# Patient Record
Sex: FEMALE | Race: WHITE | URBAN - NONMETROPOLITAN AREA
[De-identification: names, ages, dates, MRNs, and addresses within clinical notes are randomized per-mention and may not be internally consistent; named-entity substitution may affect disease eponyms.]

---

## 2020-04-22 ENCOUNTER — OFFICE VISIT (OUTPATIENT)
Dept: FAMILY MEDICINE CLINIC | Age: 30
End: 2020-04-22
Payer: COMMERCIAL

## 2020-04-22 VITALS
WEIGHT: 152 LBS | DIASTOLIC BLOOD PRESSURE: 70 MMHG | SYSTOLIC BLOOD PRESSURE: 136 MMHG | OXYGEN SATURATION: 99 % | BODY MASS INDEX: 25.95 KG/M2 | HEART RATE: 76 BPM | HEIGHT: 64 IN

## 2020-04-22 PROCEDURE — 99204 OFFICE O/P NEW MOD 45 MIN: CPT

## 2020-04-22 PROCEDURE — 1036F TOBACCO NON-USER: CPT | Performed by: FAMILY MEDICINE

## 2020-04-22 PROCEDURE — 99204 OFFICE O/P NEW MOD 45 MIN: CPT | Performed by: FAMILY MEDICINE

## 2020-04-22 PROCEDURE — G8419 CALC BMI OUT NRM PARAM NOF/U: HCPCS | Performed by: FAMILY MEDICINE

## 2020-04-22 PROCEDURE — G8427 DOCREV CUR MEDS BY ELIG CLIN: HCPCS | Performed by: FAMILY MEDICINE

## 2020-04-22 ASSESSMENT — PATIENT HEALTH QUESTIONNAIRE - PHQ9
1. LITTLE INTEREST OR PLEASURE IN DOING THINGS: 1
SUM OF ALL RESPONSES TO PHQ9 QUESTIONS 1 & 2: 2
SUM OF ALL RESPONSES TO PHQ QUESTIONS 1-9: 2
2. FEELING DOWN, DEPRESSED OR HOPELESS: 1
SUM OF ALL RESPONSES TO PHQ QUESTIONS 1-9: 2

## 2020-04-22 NOTE — PROGRESS NOTES
Taylor Kim or similar      Discussed medications use and time of action today. Side effects and precautions also reviewed today. Should follow up as scheduled but call if sx are worsening in the interim. Return in about 4 weeks (around 5/20/2020) for Pap/ well woman, Medication recheck. Patient given educational materials - see patientinstructions. Discussed use, benefit, and side effects of prescribed medications. All patient questions answered. Pt voiced understanding. Reviewed health maintenance. Instructed to continue current medications, diet andexercise. Patient agreed with treatment plan. Follow up as directed.      Electronically signed by Anatoliy Raymond MD on 4/26/2020

## 2020-06-10 ENCOUNTER — TELEPHONE (OUTPATIENT)
Dept: FAMILY MEDICINE CLINIC | Age: 30
End: 2020-06-10

## 2020-06-29 ENCOUNTER — OFFICE VISIT (OUTPATIENT)
Dept: FAMILY MEDICINE CLINIC | Age: 30
End: 2020-06-29
Payer: COMMERCIAL

## 2020-06-29 ENCOUNTER — HOSPITAL ENCOUNTER (OUTPATIENT)
Age: 30
Setting detail: SPECIMEN
Discharge: HOME | End: 2020-06-29
Payer: COMMERCIAL

## 2020-06-29 VITALS
SYSTOLIC BLOOD PRESSURE: 116 MMHG | HEART RATE: 64 BPM | DIASTOLIC BLOOD PRESSURE: 66 MMHG | HEIGHT: 62 IN | BODY MASS INDEX: 26.5 KG/M2 | WEIGHT: 144 LBS | OXYGEN SATURATION: 98 %

## 2020-06-29 LAB — HGB, POC: 12.6

## 2020-06-29 PROCEDURE — 90715 TDAP VACCINE 7 YRS/> IM: CPT | Performed by: FAMILY MEDICINE

## 2020-06-29 PROCEDURE — 99395 PREV VISIT EST AGE 18-39: CPT

## 2020-06-29 PROCEDURE — G8427 DOCREV CUR MEDS BY ELIG CLIN: HCPCS | Performed by: FAMILY MEDICINE

## 2020-06-29 PROCEDURE — 85018 HEMOGLOBIN: CPT | Performed by: FAMILY MEDICINE

## 2020-06-29 PROCEDURE — 99214 OFFICE O/P EST MOD 30 MIN: CPT | Performed by: FAMILY MEDICINE

## 2020-06-29 PROCEDURE — G8419 CALC BMI OUT NRM PARAM NOF/U: HCPCS | Performed by: FAMILY MEDICINE

## 2020-06-29 PROCEDURE — 87624 HPV HI-RISK TYP POOLED RSLT: CPT

## 2020-06-29 PROCEDURE — 1036F TOBACCO NON-USER: CPT | Performed by: FAMILY MEDICINE

## 2020-06-29 PROCEDURE — 87591 N.GONORRHOEAE DNA AMP PROB: CPT

## 2020-06-29 PROCEDURE — G0145 SCR C/V CYTO,THINLAYER,RESCR: HCPCS

## 2020-06-29 PROCEDURE — 87491 CHLMYD TRACH DNA AMP PROBE: CPT

## 2020-06-29 RX ORDER — NORGESTIMATE AND ETHINYL ESTRADIOL 7DAYSX3 LO
1 KIT ORAL DAILY
Qty: 28 TABLET | Refills: 3 | Status: SHIPPED | OUTPATIENT
Start: 2020-06-29 | End: 2020-10-13

## 2020-06-29 NOTE — PROGRESS NOTES
BP: 116/66   Site: Right Upper Arm   Position: Sitting   Cuff Size: Medium Adult   Pulse: 64   SpO2: 98%   Weight: 144 lb (65.3 kg)   Height: 5' 2\" (1.575 m)     Body mass index is 26.34 kg/m². Well-nourished, well-developed thinyoung  female, in no acute distress. Pupils are equal, round and reactive to light. Extra-ocular muscles are intact. Tympanic membranes and oropharynxare clear bilaterally. Neck is supple. There is no lymphadenopathy or thyromegaly. Breasts are symmetric. There are no dominant masses palpated bilaterally. There are no skin changes or nipple dischargebilaterally. There are no axillary lymph nodes palpable bilaterally. Chest is clear to auscultation bilaterally. Heart sounds are regular rate and rhythm without murmur. Abdomen is soft, non-tender,non-distended, with no masses. Genito-urinary:  External genitalia has no visible lesions,  vagina is normal, cervix has no visible lesions. Uterus and adnexa are non-tender and notenlarged. Rectal exam:  defered  Lower extremities have no edema. Labs done were reviewed with the patient:  No visits with results within 1 Month(s) from this visit. Latest known visit with results is:   No results found for any previous visit. Assessment and Plan:    Well female exam with routine gynecological exam  Pap smearwas obtained today using the Thin Prep method. She willbe contacted with results. not indicated   She was advised to continue annual mammograms and physical exams, with a Paptest every 3 years. Blood tests for sexually transmitted infections (HIV, ) were offered and declined. 1. Pap test, as part of routine gynecological examination    - PAP SMEAR; Future    2. Anxiety  At this point she has not had of social situations with the Covid 19 to really be able to tell if this is been effective treatment.   We will go ahead and continue on the current dose of the sertraline but would consider change to another

## 2020-07-01 ENCOUNTER — TELEPHONE (OUTPATIENT)
Dept: FAMILY MEDICINE CLINIC | Age: 30
End: 2020-07-01

## 2020-07-01 LAB
CHLAMYDIA BY THIN PREP: ABNORMAL
N. GONORRHOEAE DNA, THIN PREP: NEGATIVE

## 2020-07-01 RX ORDER — AZITHROMYCIN 500 MG/1
2000 TABLET, FILM COATED ORAL ONCE
Qty: 2 TABLET | Refills: 0 | Status: SHIPPED | OUTPATIENT
Start: 2020-07-01 | End: 2020-07-01

## 2020-07-01 NOTE — TELEPHONE ENCOUNTER
Patient informed by phone, instructed to have partner/partners treated and to abstain until after [facility] and [facility] notified.

## 2020-07-02 LAB
HPV SAMPLE: ABNORMAL
HPV, GENOTYPE 16: DETECTED
HPV, GENOTYPE 18: NOT DETECTED
HPV, HIGH RISK OTHER: NOT DETECTED
HPV, INTERPRETATION: ABNORMAL
SPECIMEN DESCRIPTION: ABNORMAL

## 2020-07-03 LAB — CYTOLOGY REPORT: NORMAL

## 2020-07-04 ENCOUNTER — TELEPHONE (OUTPATIENT)
Dept: FAMILY MEDICINE CLINIC | Age: 30
End: 2020-07-04

## 2020-07-04 NOTE — TELEPHONE ENCOUNTER
Final pap report came back with LGSIL- cannot exclude HGSIL. Patient will need a colposcopy. Will refer for this to GYN please. Left message for the patient to call about her results when the prelim came back with the option to repeat 1 this on the final should have a colposcopy.

## 2020-09-01 ENCOUNTER — HOSPITAL ENCOUNTER (OUTPATIENT)
Age: 30
Setting detail: SPECIMEN
Discharge: HOME | End: 2020-09-01
Payer: COMMERCIAL

## 2020-09-01 ENCOUNTER — PROCEDURE VISIT (OUTPATIENT)
Dept: OBGYN CLINIC | Age: 30
End: 2020-09-01
Payer: COMMERCIAL

## 2020-09-01 ENCOUNTER — TELEPHONE (OUTPATIENT)
Dept: OBGYN CLINIC | Age: 30
End: 2020-09-01

## 2020-09-01 VITALS
DIASTOLIC BLOOD PRESSURE: 73 MMHG | SYSTOLIC BLOOD PRESSURE: 130 MMHG | HEIGHT: 62 IN | WEIGHT: 135.4 LBS | BODY MASS INDEX: 24.92 KG/M2 | HEART RATE: 79 BPM

## 2020-09-01 LAB
CONTROL: PRESENT
PREGNANCY TEST URINE, POC: NEGATIVE

## 2020-09-01 PROCEDURE — 57500 BIOPSY OF CERVIX: CPT | Performed by: OBSTETRICS & GYNECOLOGY

## 2020-09-01 PROCEDURE — 57454 BX/CURETT OF CERVIX W/SCOPE: CPT | Performed by: OBSTETRICS & GYNECOLOGY

## 2020-09-01 PROCEDURE — 99203 OFFICE O/P NEW LOW 30 MIN: CPT | Performed by: OBSTETRICS & GYNECOLOGY

## 2020-09-01 PROCEDURE — 81025 URINE PREGNANCY TEST: CPT | Performed by: OBSTETRICS & GYNECOLOGY

## 2020-09-01 RX ORDER — AZITHROMYCIN 500 MG/1
1000 TABLET, FILM COATED ORAL ONCE
Qty: 2 TABLET | Refills: 0 | Status: SHIPPED | OUTPATIENT
Start: 2020-09-01 | End: 2020-09-01

## 2020-09-01 RX ORDER — AZITHROMYCIN 500 MG/1
1000 TABLET, FILM COATED ORAL ONCE
Qty: 4 TABLET | Refills: 0 | Status: SHIPPED | OUTPATIENT
Start: 2020-09-01 | End: 2020-09-01 | Stop reason: SDUPTHER

## 2020-09-01 NOTE — PATIENT INSTRUCTIONS
Patient Education        Colposcopy: What to Expect at [address] may feel some soreness in your vagina for a day or two if you had a biopsy. Some vaginal bleeding or discharge is normal for up to a week after a biopsy. The discharge may be dark-colored if a solution was put on your cervix. You can use a sanitary pad for the bleeding. It may take a week or two for you to get the test results. This care sheet gives you a general idea about how long it will take for you to recover. But each person recovers at a different pace. Follow the steps below to feel better as quickly as possible. How can you care for yourself at home? Activity  · You can return to work and most daily activities right after the test.  Exercise  · Do not exercise for 1 day after the test.  Medicines  · Your doctor will tell you if and when you can restart your medicines. He or she will also give you instructions about taking any new medicines. · If you take aspirin or some other blood thinner, ask your doctor if and when to start taking it again. Make sure that you understand exactly what your doctor wants you to do. · Take an over-the-counter pain medicine, such as acetaminophen (Tylenol), ibuprofen (Advil, Motrin), or naproxen (Aleve). Be safe with medicines. Read and follow all instructions on the label. Do not take two or more pain medicines at the same time unless the doctor told you to. Many pain medicines have acetaminophen, which is Tylenol. Too much acetaminophen (Tylenol) can be harmful. Other instructions  · Use a pad if you have some bleeding. · Do not douche, have sexual intercourse, or use tampons for 1 week if you had a biopsy. This will allow time for your cervix to heal.  · You can take a bath or shower anytime after the test.  Follow-up care is a key part of your treatment and safety. Be sure to make and go to all appointments, and call your doctor if you are having problems.  It's also a good idea to know your test results and keep a list of the medicines you take. When should you call for help? Call your doctor now or seek immediate medical care if:  · You have severe vaginal bleeding. This means that you are soaking through your usual pads or tampons each hour for 2 or more hours. · You have pain that does not get better after you take pain medicine. · You have signs of infection, such as:  ? Increased pain. ? Bad-smelling vaginal discharge. ? A fever. Watch closely for any changes in your health, and be sure to contact your doctor if:  · You have questions or concerns. Where can you learn more? Go to https://Tie [email or web address].[employer]. org and sign in to your [employer] account. Enter M523 in the [employer] box to learn more about \"Colposcopy: What to Expect at Home. \"     If you do not have an account, please click on the \"Sign Up Now\" link. Current as of: August 22, 2019               Content Version: 12.5  © 2052-3490 Healthwise, Incorporated. Care instructions adapted under license by [facility] ([facility]). If you have questions about a medical condition or this instruction, always ask your healthcare professional. [address] any warranty or liability for your use of this information.

## 2020-09-01 NOTE — PROGRESS NOTES
Brett Cerda  9/1/2020                         Primary Care Physician: Melida Osman MD    CC:   Chief Complaint   Patient presents with    Procedure         HPI: Brett Cerda is a 35 y.o. female [place] Patient's last menstrual period was 08/22/2020. The patient was seen and examined. She is here to establish care with a GYN due to abnormal pap smear which was ASC-H and +HPV16 on 6/29/20. She was diagnosed and treated for chlamydia at that time. She is sexually active with a male partner. She does not use condoms consistently. Admits that her partner was not treated for chlamydia. Her periods are regular and last 4-7 days. She describes them as lighter and less painful while on OCPs. Her bowel habits are regular. She denies any bloating. She denies dysuria. She denies urinary leaking. She denies vaginal discharge.       Depression Screen: Symptoms of decreased mood absent  Symptoms of anhedonia absent  **If either question is answered in a  positive fashion then complete the PHQ9 Scoring Evaluation and make the appropriate referral**    REVIEW OF SYSTEMS:   Constitutional: negative fever, negative chills  HEENT: negative visual disturbances, negative headaches  Respiratory: negative dyspnea, negative cough  Cardiovascular: negative chest pain,  negative palpitations  Gastrointestinal: negative abdominal pain, negative RUQ pain, negative N/V, negative diarrhea, negative constipation  Genitourinary: negative dysuria, negative vaginal discharge  Dermatological: negative rash  Hematologic: negative bruising  Immunologic/Lymphatic: negative recent illness, negative recent sick contact  Musculoskeletal: negative back pain, negative myalgias, negative arthralgias  Neurological:  negative dizziness, negative weakness  Behavior/Psych: negative depression, negative anxiety      GYNECOLOGICAL HISTORY:  Age of Menarche: 16  Age of Menopause: n/a     Sexually Active: has sex with males  STD History: past history: chlamydia    Pap History: ASC-H +HPV16 20  Colposcopy History: denies    Permanent Sterilization: no  Reversible Birth Control: yes - OCPs  Hormone Replacement Exposure: no    OBSTETRICAL HISTORY:  OB History    Para Term  AB Living   0 0 0 0 0 0   SAB TAB Ectopic Molar Multiple Live Births   0 0 0 0 0 0       PAST MEDICAL HISTORY:   has a past medical history of Abnormal Pap smear of cervix. PAST SURGICAL HISTORY:   has a past surgical history that includes [place] tooth extraction. ALLERGIES:  has No Known Allergies. MEDICATIONS:  Prior to Admission medications    Medication Sig Start Date End Date Taking? Authorizing Provider   azithromycin (ZITHROMAX) 500 MG tablet Take 2 tablets by mouth once for 1 dose Additional dose given for expedited partner therapy. 20 Yes See-Yin So, DO   sertraline (ZOLOFT) 50 MG tablet Take 1 tablet by mouth daily 20  Yes Sylvie Cruz MD   Norgestim-Eth Estrad Triphasic 0.18/0.215/0.25 MG-25 MCG TABS Take 1 each by mouth daily 20  Yes Sylvie Cruz MD       FAMILY HISTORY:  Family History of Breast, Ovarian, Colon or Uterine Cancer: No   family history includes Dementia in her maternal grandmother; Heart Disease in her paternal grandfather and paternal grandmother. SOCIAL HISTORY:   reports that she has never smoked. She has never used smokeless tobacco. She reports current alcohol use. She reports that she does not use drugs.     HEALTH MAINTENANCE:  Immunization status: stated as up to date, some records available  Gardisil immunization: discussed     VITALS:  Vitals:    20 0840   BP: 130/73   Site: Left Upper Arm   Position: Sitting   Cuff Size: Medium Adult   Pulse: 79   Weight: 135 lb 6.4 oz (61.4 kg)   Height: 5' 2\" (1.575 m)                                                                                                                                                                         PHYSICAL EXAM: General Appearance: Appears healthy. Alert; in no acute distress. Pleasant. Skin: Skin color, texture, turgor normal. No rashes or lesions. HEENT: normocephalic and atraumatic   Respiratory: Normal expansion. Clear to auscultation. No rales, rhonchi, or wheezing. Cardiovascular: normal rate and normal S1 and S2  Abdomen: soft, non-tender, non-distended, no right upper quadrant tenderness and no CVA tenderness   Pelvic Exam:   External genitalia: normal hair distribution, no lesions or erythema  Urinary system: urethral meatus normal, no bladder tenderness  Vaginal: normal mucosa, no lesions, thick white discharge noted  Cervix: normal appearing cervix without discharge or lesions, no CMT  Adnexa: normal adnexa in size, nontender and no masses  Uterus: about 6-8wk size, anteverted, nontender, no masses  Musculoskeletal: no gross abnormalities  Extremities: non-tender BLE and non-edematous  Psych:  oriented to time, place and person, mood and affect are within normal limits     ASSESSMENT & PLAN:    Maryse Alfaro is a 35 y.o. female  Patient's last menstrual period was 2020.   - reviewed ASCCP guidelines and recommendations. See separate note for colposcopy findings   - Reviewed medical management options for dysmenorrhea, including side effect profiles and dosing regimens. All questions answered. Patient desires to continue with OCPs. - She is unsure if she received the Gardasil immunization   - reviewed findings of increased vaginal discharge today and concern as patient's partner had not been treated for chlamydia. Reviewed that it is an STD. Recommended treatment with azithromycin for her and her partner. Discussed that they will need to abstain until at least one week after treatment. Recommend repeat STD testing in 3 months to ensure that she is not reinfected.  She vocalized understanding      Patient Active Problem List    Diagnosis Date Noted    Dysmenorrhea     Abnormal Pap smear of cervix        Return in about 3 months (around 12/1/2020) for follow up JOSEE. No Patient Care Coordination Note on file. Counseling Completed:    Discussed need for repeat pap as per American Society for Colposcopy and Cervical Pathology guidelines. Birth control and barrier recommendations reviewed. Discussed STD counseling and prevention. Gardisil counseling completed for all patients 7-33 yo. Hereditary Breast, Ovarian, Colon and Uterine Cancer screening done. Tobacco & Secondary smoke risks reviewed; with recommendation for cessation and avoidance. Routine health maintenance per patients PCP. Diagnosis Orders   1. Encounter to establish care     2. Atypical squamous cells cannot exclude high grade squamous intraepithelial lesion on cytologic smear of cervix (ASC-H)  POCT urine pregnancy    Surgical Pathology    FL COLPOSC,CERVIX W/ADJ VAG,W/BX & CURRETAG    Surgical Pathology    Surgical Pathology   3. HPV in female  POCT urine pregnancy    Surgical Pathology    FL COLPOSC,CERVIX W/ADJ VAG,W/BX & CURRETAG   4. Chlamydia infection  azithromycin (ZITHROMAX) 500 MG tablet    DISCONTINUED: azithromycin (ZITHROMAX) 500 MG tablet   5.  Dysmenorrhea            See-DO [facility] [place] more  9/1/2020 9:13 AM

## 2020-09-01 NOTE — TELEPHONE ENCOUNTER
[facility] cannot bill the script like it's sent. They will need to different scripts sent.  They can't bill for 2 different people on the same script

## 2020-09-03 LAB — SURGICAL PATHOLOGY REPORT: NORMAL

## 2020-10-26 ENCOUNTER — VIRTUAL VISIT (OUTPATIENT)
Dept: FAMILY MEDICINE CLINIC | Age: 30
End: 2020-10-26
Payer: COMMERCIAL

## 2020-10-26 PROCEDURE — 99214 OFFICE O/P EST MOD 30 MIN: CPT | Performed by: FAMILY MEDICINE

## 2020-10-26 RX ORDER — ESCITALOPRAM OXALATE 10 MG/1
10 TABLET ORAL DAILY
Qty: 30 TABLET | Refills: 5 | Status: SHIPPED | OUTPATIENT
Start: 2020-10-26 | End: 2022-04-25

## 2020-10-26 ASSESSMENT — PATIENT HEALTH QUESTIONNAIRE - PHQ9
SUM OF ALL RESPONSES TO PHQ QUESTIONS 1-9: 1
1. LITTLE INTEREST OR PLEASURE IN DOING THINGS: 0
SUM OF ALL RESPONSES TO PHQ QUESTIONS 1-9: 1
SUM OF ALL RESPONSES TO PHQ9 QUESTIONS 1 & 2: 1
2. FEELING DOWN, DEPRESSED OR HOPELESS: 1
SUM OF ALL RESPONSES TO PHQ QUESTIONS 1-9: 1

## 2020-10-26 NOTE — PROGRESS NOTES
10/26/2020    TELEHEALTH EVALUATION -- Audio/Visual (During COVBG-60 public health emergency)    Chief Complaint   Patient presents with    Anxiety     follow up zoloft- she wants to discuss trying something different, she does not like the way it makes her feel. Lety Vicente (:  1990) has requested an audio/video evaluation for the following concern(s):    HPI    Is back to work in person now. Going pretty well. Doing things a lot differently. Feels like her sertraline makes her feels more quiet. Wonders if it is \"too strong\". Did not really see a lot of change. Feels almost numb. Before wanted to talk and be more talkative. Sleeing well most of the time. AT times will be overthinking abut things but most of the time sleeps well. When she first started taking it had some trouble but that passed. Not really feeling down at all/  Will occasionally feel sad. When she feels sad it is more intense than it used to be and it is all she can think about when it happens. No thoughts about hurting herself  Within the last few months had 1 episode when she was overwhelmed with a lot going one. On the OCP she has had some painful periods. Has had one painful period. It was really bad. The other periods prior to this had been really overall significantly better with the oral contraceptive use. The bleeding and the pain had been significantly improved. Wonders if this is just a unusual cycle for her now and they would be better if she goes on. Review of Systems    Prior to Visit Medications    Medication Sig Taking?  Authorizing Provider   escitalopram (LEXAPRO) 10 MG tablet Take 1 tablet by mouth daily Yes Maria Luz Nichols MD   TRI-LO-JOE 0.18/0.215/0.25 MG-25 MCG TABS TAKE 1 TABLET BY MOUTH ONE TIME A DAY  Yes Maria Luz Nichols MD       Social History     Tobacco Use    Smoking status: Never Smoker    Smokeless tobacco: Never Used   Substance Use Topics    Alcohol use: Yes     Comment: occasionally    Drug use: Never        No Known Allergies,   Past Medical History:   Diagnosis Date    Abnormal Pap smear of cervix     Dysmenorrhea    ,   Past Surgical History:   Procedure Laterality Date    WISDOM TOOTH EXTRACTION     ,   Social History     Tobacco Use    Smoking status: Never Smoker    Smokeless tobacco: Never Used   Substance Use Topics    Alcohol use: Yes     Comment: occasionally    Drug use: Never   ,   Family History   Problem Relation Age of Onset    Dementia Maternal Grandmother     Heart Disease Paternal Grandmother     Heart Disease Paternal Grandfather     Breast Cancer Neg Hx     Colon Cancer Neg Hx     Uterine Cancer Neg Hx     Ovarian Cancer Neg Hx    ,   Immunization History   Administered Date(s) Administered    DTP 1991, 01/13/1992, 04/09/1992    DTaP vaccine 01/24/1994    Hepatitis B Ped/Adol (Engerix-B, Recombivax HB) 07/28/2004, 09/01/2004    Hib vaccine 1991, 01/31/1992, 04/09/1992, 08/23/1993    Influenza A (X1I3-57) Vaccine PF IM 10/20/2009    MMR 08/23/1993, 07/28/2004    Polio OPV 1991, 01/13/1992, 01/24/1994    Td vaccine (adult) 07/28/2004    Tdap (Boostrix, Adacel) 06/29/2020   ,   Health Maintenance   Topic Date Due    Varicella vaccine (1 of 2 - 2-dose childhood series) 08/24/1992    Hepatitis B vaccine (3 of 3 - 3-dose primary series) 11/17/2004    HIV screen  08/24/2006    Flu vaccine (1) 09/01/2020    Cervical cancer screen  06/29/2021    DTaP/Tdap/Td vaccine (7 - Td) 06/29/2030    Hib vaccine  Completed    Hepatitis A vaccine  Aged Out    Meningococcal (ACWY) vaccine  Aged Out    Pneumococcal 0-64 years Vaccine  Aged Out       PHYSICAL EXAMINATION:  [ INSTRUCTIONS:  \"[x]\" Indicates a positive item  \"[]\" Indicates a negative item  -- DELETE ALL ITEMS NOT EXAMINED]  Vital Signs: (As obtained by patient/caregiver or practitioner observation)  No flowsheet data found. Constitutional: [x] Appears well-developed and well-nourished [x] No apparent distress      [] Abnormal-   Mental status  [x] Alert and awake  [x] Oriented to person/place/time [x]Able to follow commands      Eyes:  EOM    [x]  Normal  [] Abnormal-  Sclera  [x]  Normal  [] Abnormal -         Discharge [x]  None visible  [] Abnormal -    HENT:   [x] Normocephalic, atraumatic. [] Abnormal   [x] Mouth/Throat: Mucous membranes are moist.     External Ears [x] Normal  [] Abnormal-     Neck: [x] No visualized mass     Pulmonary/Chest: [x] Respiratory effort normal.  [x] No visualized signs of difficulty breathing or respiratory distress        [] Abnormal-      Musculoskeletal:   [] Normal gait with no signs of ataxia         [x] Normal range of motion of neck        [] Abnormal-       Neurological:        [x] No Facial Asymmetry (Cranial nerve 7 motor function) (limited exam to video visit)          [x] No gaze palsy        [] Abnormal-         Skin:        [x] No significant exanthematous lesions or discoloration noted on facial skin         [] Abnormal-            Psychiatric:       [x] Normal Affect [x] No Hallucinations        [] Abnormal-     Other pertinent observable physical exam findings-     Due to this being a TeleHealth encounter, evaluation of the following organ systems is limited: Vitals/Constitutional/EENT/Resp/CV/GI//MS/Neuro/Skin/Heme-Lymph-Imm. ASSESSMENT/PLAN:  1. Anxiety  We will do the change to the S-Citalopram for her anxiety. Reviewed emergency procedures if she has increasing depressive symptoms. Hopefully over the next several weeks she will have improvement in her anxiety or depressive symptoms without the blunting of her affect that she is currently experiencing.   If this is also problematic to increase consider a trial of something like buspirone to see if this is more effective for her.  - escitalopram (LEXAPRO) 10 MG tablet; Take 1 tablet by mouth daily  Dispense: 30 tablet; Refill: 5    2. Menorrhagia with irregular cycle  Continue on the current dosage of her OCP. This is a low estrogen containing pill could try a monophasic with a slightly higher dose or different progesterone to see if this is effective for her if not improving. She will let us know about both of these issues in about 4 weeks. Return in about 4 weeks (around 11/23/2020). An  electronic signature was used to authenticate this note. --Claudio Scott MD on 10/26/2020 at 10:38 AM        Pursuant to the emergency declaration under the [facility] waiver authority and the [employer] and [employer] Act, this Virtual  Visit was conducted, with patient's consent, to reduce the patient's risk of exposure to COVID-19 and provide continuity of care for an established patient. Patient location: Patient home  Provider location: Provider clinic  Services were provided through a video synchronous discussion virtually to substitute for in-person clinic visit.

## 2020-11-23 ENCOUNTER — VIRTUAL VISIT (OUTPATIENT)
Dept: FAMILY MEDICINE CLINIC | Age: 30
End: 2020-11-23
Payer: COMMERCIAL

## 2020-11-23 PROCEDURE — 99214 OFFICE O/P EST MOD 30 MIN: CPT | Performed by: FAMILY MEDICINE

## 2020-11-23 ASSESSMENT — PATIENT HEALTH QUESTIONNAIRE - PHQ9
SUM OF ALL RESPONSES TO PHQ QUESTIONS 1-9: 0
SUM OF ALL RESPONSES TO PHQ9 QUESTIONS 1 & 2: 0
2. FEELING DOWN, DEPRESSED OR HOPELESS: 0
SUM OF ALL RESPONSES TO PHQ QUESTIONS 1-9: 0
1. LITTLE INTEREST OR PLEASURE IN DOING THINGS: 0
SUM OF ALL RESPONSES TO PHQ QUESTIONS 1-9: 0

## 2020-11-23 NOTE — PROGRESS NOTES
2020    TELEHEALTH EVALUATION -- Audio/Visual (During RIKSA-72 public health emergency)    Chief Complaint   Patient presents with    Anxiety     follow up after starting lexapro- doing well on lexapro. She feels like it does not \"hit her as hard\" as the zoloft. Krissy Amaya (:  1990) has requested an audio/video evaluation for the following concern(s):    HPI  Was started on the sertraline for her social anxiety and had not Felt as good on this. Had more trouble with some insomnia and overthinking. She was almost numb at times on the Sertraline. HAs not had times where she was down or inappropriately sad at all. Does feel like she is doing better,  Feels calmer. Sleeping well over all. Energy is good. No anxiety attacks. No issue sat all with the medications. Feels like maybe 25% better-- difficult to say really because she has not been in any larger groups where she normally has had her greatest issues. Has not had a menstrual cycle to really tell is her periods are back to normal.     Review of Systems    Prior to Visit Medications    Medication Sig Taking?  Authorizing Provider   escitalopram (LEXAPRO) 10 MG tablet Take 1 tablet by mouth daily Yes Wendy Mock MD   TRI-LO-JOE 0.18/0.215/0.25 MG-25 MCG TABS TAKE 1 TABLET BY MOUTH ONE TIME A DAY  Yes Wendy Mock MD       Social History     Tobacco Use    Smoking status: Never Smoker    Smokeless tobacco: Never Used   Substance Use Topics    Alcohol use: Yes     Comment: occasionally    Drug use: Never        No Known Allergies,   Past Medical History:   Diagnosis Date    Abnormal Pap smear of cervix     Dysmenorrhea    ,   Past Surgical History:   Procedure Laterality Date    WISDOM TOOTH EXTRACTION     ,   Social History     Tobacco Use    Smoking status: Never Smoker    Smokeless tobacco: Never Used   Substance Use Topics    Alcohol use: Yes     Comment: occasionally    Drug use: Never   ,   Family History Problem Relation Age of Onset    Dementia Maternal Grandmother     Heart Disease Paternal Grandmother     Heart Disease Paternal Grandfather     Breast Cancer Neg Hx     Colon Cancer Neg Hx     Uterine Cancer Neg Hx     Ovarian Cancer Neg Hx    ,   Immunization History   Administered Date(s) Administered    DTP 1990, 01/13/1992, 04/09/1992    DTaP vaccine 01/24/1994    Hepatitis B Ped/Adol (Engerix-B, Recombivax HB) 07/28/2004, 09/01/2004    Hib vaccine 1990, 01/31/1992, 04/09/1992, 08/23/1993    Influenza A (L1E1-98) Vaccine PF IM 10/20/2009    MMR 08/23/1993, 07/28/2004    Polio OPV 1991, 01/13/1992, 01/24/1994    Td vaccine (adult) 07/28/2004    Tdap (Boostrix, Adacel) 06/29/2020   ,   Health Maintenance   Topic Date Due    Varicella vaccine (1 of 2 - 2-dose childhood series) 08/24/1992    Hepatitis B vaccine (3 of 3 - 3-dose primary series) 11/17/2004    HIV screen  08/24/2006    Flu vaccine (1) 09/01/2020    Cervical cancer screen  06/29/2021    DTaP/Tdap/Td vaccine (7 - Td) 06/29/2030    Hib vaccine  Completed    Hepatitis A vaccine  Aged Out    Meningococcal (ACWY) vaccine  Aged Out    Pneumococcal 0-64 years Vaccine  Aged Out       PHYSICAL EXAMINATION:  [ INSTRUCTIONS:  \"[x]\" Indicates a positive item  \"[]\" Indicates a negative item  -- DELETE ALL ITEMS NOT EXAMINED]  Vital Signs: (As obtained by patient/caregiver or practitioner observation)  No flowsheet data found. Constitutional: [x] Appears well-developed and well-nourished [x] No apparent distress      [] Abnormal-   Mental status  [x] Alert and awake  [x] Oriented to person/place/time [x]Able to follow commands      Eyes:  EOM    [x]  Normal  [] Abnormal-  Sclera  [x]  Normal  [] Abnormal -         Discharge [x]  None visible  [] Abnormal -    HENT:   [x] Normocephalic, atraumatic.   [] Abnormal   [x] Mouth/Throat: Mucous membranes are moist.     External Ears [x] Normal  [] Abnormal-     Neck: [x] No

## 2022-04-07 RX ORDER — NORGESTIMATE AND ETHINYL ESTRADIOL 7DAYSX3 LO
KIT ORAL
Qty: 28 TABLET | Refills: 0 | OUTPATIENT
Start: 2022-04-07

## 2022-04-08 RX ORDER — NORGESTIMATE AND ETHINYL ESTRADIOL 7DAYSX3 LO
KIT ORAL
Qty: 28 TABLET | Refills: 0 | OUTPATIENT
Start: 2022-04-08

## 2022-04-11 RX ORDER — NORGESTIMATE AND ETHINYL ESTRADIOL 7DAYSX3 LO
KIT ORAL
Qty: 28 TABLET | Refills: 0 | OUTPATIENT
Start: 2022-04-11

## 2022-04-25 ENCOUNTER — OFFICE VISIT (OUTPATIENT)
Dept: FAMILY MEDICINE CLINIC | Age: 32
End: 2022-04-25
Payer: COMMERCIAL

## 2022-04-25 VITALS
HEIGHT: 62 IN | SYSTOLIC BLOOD PRESSURE: 104 MMHG | HEART RATE: 72 BPM | DIASTOLIC BLOOD PRESSURE: 60 MMHG | WEIGHT: 137 LBS | OXYGEN SATURATION: 99 % | BODY MASS INDEX: 25.21 KG/M2

## 2022-04-25 DIAGNOSIS — N94.6 DYSMENORRHEA: Primary | ICD-10-CM

## 2022-04-25 PROCEDURE — 99213 OFFICE O/P EST LOW 20 MIN: CPT | Performed by: FAMILY MEDICINE

## 2022-04-25 RX ORDER — NORGESTIMATE AND ETHINYL ESTRADIOL 7DAYSX3 LO
KIT ORAL
Qty: 84 TABLET | Refills: 3 | Status: SHIPPED | OUTPATIENT
Start: 2022-04-25

## 2022-04-25 SDOH — ECONOMIC STABILITY: TRANSPORTATION INSECURITY
IN THE PAST 12 MONTHS, HAS THE LACK OF TRANSPORTATION KEPT YOU FROM MEDICAL APPOINTMENTS OR FROM GETTING MEDICATIONS?: NO

## 2022-04-25 SDOH — ECONOMIC STABILITY: FOOD INSECURITY: WITHIN THE PAST 12 MONTHS, YOU WORRIED THAT YOUR FOOD WOULD RUN OUT BEFORE YOU GOT MONEY TO BUY MORE.: NEVER TRUE

## 2022-04-25 SDOH — ECONOMIC STABILITY: FOOD INSECURITY: WITHIN THE PAST 12 MONTHS, THE FOOD YOU BOUGHT JUST DIDN'T LAST AND YOU DIDN'T HAVE MONEY TO GET MORE.: NEVER TRUE

## 2022-04-25 SDOH — ECONOMIC STABILITY: TRANSPORTATION INSECURITY
IN THE PAST 12 MONTHS, HAS LACK OF TRANSPORTATION KEPT YOU FROM MEETINGS, WORK, OR FROM GETTING THINGS NEEDED FOR DAILY LIVING?: NO

## 2022-04-25 ASSESSMENT — PATIENT HEALTH QUESTIONNAIRE - PHQ9
1. LITTLE INTEREST OR PLEASURE IN DOING THINGS: 0
SUM OF ALL RESPONSES TO PHQ QUESTIONS 1-9: 0
SUM OF ALL RESPONSES TO PHQ9 QUESTIONS 1 & 2: 0
SUM OF ALL RESPONSES TO PHQ QUESTIONS 1-9: 0
2. FEELING DOWN, DEPRESSED OR HOPELESS: 0

## 2022-04-25 ASSESSMENT — SOCIAL DETERMINANTS OF HEALTH (SDOH): HOW HARD IS IT FOR YOU TO PAY FOR THE VERY BASICS LIKE FOOD, HOUSING, MEDICAL CARE, AND HEATING?: NOT HARD AT ALL

## 2022-04-25 NOTE — PROGRESS NOTES
[address] [facility]  [address]  Dept: [phone]  Dept NW:[phone]    Vikki Lipscomb is a 28 y.o. female who presents today for her medical conditions/complaints as notedbelow. Vikki Lipscomb is c/o of Follow-up (follow up - needs refills)      HPI:     HPI    Periods have been pretty regular. Her cramping is reasonable at this time. Usually bleeds about 4-5 days per cycle. Periods are medium flow. She continues on her birth control pill and tolerates it well. Had a colpo with BX in 9/2020. This was done through GYN. This was negative and she was told she should follow-up for repeat Pap in 1 to 2 years. She has not had that done. BP Readings from Last 3 Encounters:   04/25/22 104/60   09/01/20 130/73   06/29/20 116/66          (goal 120/80)    Wt Readings from Last 3 Encounters:   04/25/22 137 lb (62.1 kg)   09/01/20 135 lb 6.4 oz (61.4 kg)   06/29/20 144 lb (65.3 kg)        Past Medical History:   Diagnosis Date    Abnormal Pap smear of cervix     Dysmenorrhea       Past Surgical History:   Procedure Laterality Date    WISDOM TOOTH EXTRACTION         Family History   Problem Relation Age of Onset    Dementia Maternal Grandmother     Heart Disease Paternal Grandmother     Heart Disease Paternal Grandfather     Breast Cancer Neg Hx     Colon Cancer Neg Hx     Uterine Cancer Neg Hx     Ovarian Cancer Neg Hx        Social History     Tobacco Use    Smoking status: Never Smoker    Smokeless tobacco: Never Used   Substance Use Topics    Alcohol use: Yes     Comment: occasionally      Current Outpatient Medications   Medication Sig Dispense Refill    Norgestim-Eth Estrad Triphasic (TRI-LO-JOE) 0.18/0.215/0.25 MG-25 MCG TABS TAKE 1 TABLET BY MOUTH EVERY DAY 84 tablet 3     No current facility-administered medications for this visit.      No Known Allergies    Health Maintenance   Topic Date Due    COVID-19 Vaccine (1) Never done    Hepatitis B vaccine (3 of 3 - 3-dose primary series) 11/17/2004    HIV screen  Never done    Hepatitis C screen  Never done    Flu vaccine (Season Ended) 09/01/2022    Depression Screen  04/25/2023    Cervical cancer screen  06/29/2025    DTaP/Tdap/Td vaccine (7 - Td or Tdap) 06/29/2030    Hib vaccine  Completed    Hepatitis A vaccine  Aged Out    Meningococcal (ACWY) vaccine  Aged Out    Pneumococcal 0-64 years Vaccine  Aged Out    Varicella vaccine  Discontinued       Subjective:      Review of Systems    Objective:     /60 (Site: Left Upper Arm, Position: Sitting, Cuff Size: Medium Adult)   Pulse 72   Ht 5' 2\" (1.575 m)   Wt 137 lb (62.1 kg)   LMP 03/31/2022   SpO2 99%   BMI 25.06 kg/m²     Physical Exam  Vitals and nursing note reviewed. Constitutional:       Appearance: Normal appearance. She is well-developed. HENT:      Head: Normocephalic and atraumatic. Eyes:      Conjunctiva/sclera: Conjunctivae normal.   Neck:      Thyroid: No thyromegaly. Vascular: No JVD. Cardiovascular:      Rate and Rhythm: Normal rate and regular rhythm. Heart sounds: Normal heart sounds. No murmur heard. No friction rub. No gallop. Pulmonary:      Effort: Pulmonary effort is normal. No respiratory distress. Breath sounds: Normal breath sounds. Abdominal:      Palpations: Abdomen is soft. Musculoskeletal:      Cervical back: Normal range of motion and neck supple. Right lower leg: No edema. Left lower leg: No edema. Lymphadenopathy:      Cervical: No cervical adenopathy. Skin:     General: Skin is warm. Neurological:      General: No focal deficit present. Mental Status: She is alert and oriented to person, place, and time. Assessment/Plan:     1.  Dysmenorrhea  -     Norgestim-Eth Estrad Triphasic (TRI-LO-JOE) 0.18/0.215/0.25 MG-25 MCG TABS; TAKE 1 TABLET BY MOUTH EVERY DAY, Disp-84 tablet, R-3Normal    Dysmenorrhea is well controlled at this time.  We will schedule a return visit for her Pap test in the next several months. Continue on the current hormonal contraceptive in the interim. Lab Results   Component Value Date    HGB 12.6 06/29/2020       Return for Pap/ well woman. Multiple labs and other testing may have been ordered which may not be completely evident from the above note due to system interface incompatibilities. Patient given educational materials - see patientinstructions. Discussed use, benefit, and side effects of prescribed medications. All patient questions answered. Pt voiced understanding. Reviewed health maintenance. Instructed to continue current medications, diet andexercise. Patient agreed with treatment plan. Follow up as directed.      (Please note that portions of this note were completed with a voice-recognition program. Efforts were made to edit the dictation but occasionally words are mis-transcribed.)    Electronically signed by Kori Rehman MD on 5/1/2022

## 2022-07-18 ENCOUNTER — OFFICE VISIT (OUTPATIENT)
Dept: FAMILY MEDICINE CLINIC | Age: 32
End: 2022-07-18
Payer: COMMERCIAL

## 2022-07-18 VITALS
DIASTOLIC BLOOD PRESSURE: 56 MMHG | WEIGHT: 134 LBS | OXYGEN SATURATION: 99 % | BODY MASS INDEX: 24.66 KG/M2 | SYSTOLIC BLOOD PRESSURE: 106 MMHG | HEART RATE: 78 BPM | HEIGHT: 62 IN

## 2022-07-18 DIAGNOSIS — Z01.419 PAP SMEAR, AS PART OF ROUTINE GYNECOLOGICAL EXAMINATION: ICD-10-CM

## 2022-07-18 DIAGNOSIS — Z00.00 WELL ADULT EXAM: Primary | ICD-10-CM

## 2022-07-18 PROCEDURE — 99395 PREV VISIT EST AGE 18-39: CPT | Performed by: FAMILY MEDICINE

## 2022-07-18 NOTE — PROGRESS NOTES
Marshall Thapa  1990  MRN:  [id number]  Date of visit:22    Subjective: Marshall Thapa is a 28 y.o. female who presentsto [address] [place] [address] today (22) for annual female exam colposcopy follow up and Pap smear. Her last Pap test was 2020 and was abnormal - LGSIL; Low Grade Intraepithelial Lesion  With similar findings on her colposcopy and path from the biopsy showed acute and chronic cervicitis from GYN      She is a  Patient's last menstrual period was 2022. She has had 1 abnormal Pap tests that she is aware of. Some vaginal discharge but no pain. Doing well on her current OCP. Periods are regular and cramping is controlled. Chief Complaint   Patient presents with    Gynecologic Exam        She has thefollowing problem list:  Patient Active Problem List   Diagnosis    Dysmenorrhea    Abnormal Pap smear of cervix        Current medications are:  Current Outpatient Medications   Medication Sig Dispense Refill    Norgestim-Eth Estrad Triphasic (TRI-LO-JOE) 0.18/0.215/0.25 MG-25 MCG TABS TAKE 1 TABLET BY MOUTH EVERY DAY 84 tablet 3     No current facility-administered medications for this visit. She has No Known Allergies. She  reports that she has never smoked. She has never used smokeless tobacco.    Review of Systems    Objective:    Vitals:    22 1050   BP: (!) 106/56   Site: Left Upper Arm   Position: Sitting   Cuff Size: Medium Adult   Pulse: 78   SpO2: 99%   Weight: 134 lb (60.8 kg)   Height: 5' 2\" (1.575 m)     Body mass index is 24.51 kg/m². Well-nourished, well-developedhealthy weightyoung female, in no acute distress. Pupils are equal, round and reactive to light. Extra-ocular muscles are intact. Tympanic membranes and oropharynx areclear bilaterally. Neck is supple. There is no lymphadenopathy or thyromegaly. Skin has no worrisome nevi or rashes  Breasts are symmetric.   There are no dominant masses palpated bilaterally. There are noskin changes or nipple discharge bilaterally. There are no axillary lymph nodes palpable bilaterally. Chest is clear to auscultation bilaterally. Heart sounds are regular rate and rhythm without murmur. Abdomenis soft, non-tender, non-distended, with no masses. Genito-urinary:  External genitalia has no visible lesions, vagina is normal, cervix has no visible lesions. Uterus and adnexa are non-tender and not enlarged. Rectal exam: external exam is unremarkable  Lower extremities have no edema. Labs done were reviewed:  No visits with results within 1 Month(s) from this visit. Latest known visit with results is:   Hospital Outpatient Visit on 09/01/2020   Component Date Value Ref Range Status    Surgical Pathology Report 09/01/2020    Final                    Value:-- Diagnosis --       1. ENDOCERVIX, CURETTAGE:       -  ENDOCERVICAL COLUMNAR MUCOSA WITH ACUTE AND CHRONIC  CERVICITIS.       -  NEGATIVE FOR SQUAMOUS MUCOSA OR DYSPLASIA. 2.  CERVIX, 11:00, BIOPSY:     -  TISSUE DID NOT SURVIVE PROCESSING. 3.  CERVIX, 6:00, BIOPSY:            -  ENDOCERVICAL COLUMNAR MUCOSA WITH ACUTE AND CHRONIC  CERVICITIS.         -  NEGATIVE FOR SQUAMOUS MUCOSA OR DYSPLASIA. Fatoumata Baez M.D.  **Electronically Signed Out**         jet/9/2/2020       Clinical Information  Pre-op Diagnosis:   ASC-H, HPV IN FEMALE; LSIL HPV POSITIVE  Operative Findings:  ECC; 11:00 BX; 6:00 BX    Source of Specimen  1: ECC  2: 11 O'CLOCK BX  3: 6 O'CLOCK BX    Gross Description  1. \"KG STEPHENS, ECC\" Mucinous fragments, 2.7 x 1.5 x 0.1 cm in  aggregate. Entirely 1cs. 2. \"KG STEPHENS, 11:00 BX\" Scant mucinous fragments, 1.0 x 0.3 x <  0.1 cm in aggregate. May not survive processing. Entirely 1cs. 3. \"KG STEPHENS, 6:00 BX\" Mucinous fragments, 0.6 x 0                          .3 x 0.1 cm in  aggregate. Entirely 1cs. rh tm      Microscopic Description  1.   Sections of endocervical columnar mucosa and mucus show acute and  chronic inflammation. There is no evidence of dysplasia. 2. Tissue  did not survive processing. 3. Fragments of endocervical columnar  mucosa contain acute and chronic inflammation. There is no evidence  of squamous mucosa or dysplasia. SURGICAL PATHOLOGY CONSULTATION       Patient Name: Stan Mix: [id number]  Path Number: [id number]    [facility] [address] [facility], [address]  [phone]  Fax: [phone]         Assessment and Plan:    Well female exam with routine gynecological exam  Pap smear was obtainedtoday using the Thin Prep method. She will be contacted with results. Mammogram is not indicated today   She was advised to continue recommended schedule for age mammograms andphysical exams, with a Pap test every 3-5 years. 1. Well adult exam      2. Pap smear, as part of routine gynecological examination    - PAP SMEAR; Future      Routine health maintenance  Immunizations reviewed and  none  indicated at this time-- will review her immunization record as she believes she has all up to date. Annual flu vaccine recommended. Importance of healthydiet with adequate dietary calcium and vitamin D reviewed. Seat belt use, sunscreen use reviewed today. Importance of regular physical activity also reviewed.

## 2022-07-26 ENCOUNTER — TELEPHONE (OUTPATIENT)
Dept: FAMILY MEDICINE CLINIC | Age: 32
End: 2022-07-26

## 2022-07-26 DIAGNOSIS — R87.613 HIGH GRADE SQUAMOUS INTRAEPITHELIAL LESION (HGSIL) ON CYTOLOGIC SMEAR OF CERVIX: Primary | ICD-10-CM

## 2022-07-26 DIAGNOSIS — R87.618 PAP SMEAR ABNORMALITY OF CERVIX/HUMAN PAPILLOMAVIRUS (HPV) POSITIVE: ICD-10-CM

## 2022-07-26 LAB — GYNECOLOGY CYTOLOGY REPORT: NORMAL

## 2022-07-27 NOTE — TELEPHONE ENCOUNTER
Patient notified. She previously saw a DR in [place] and would like referral back to that facility. Referral ordered. 15-Jul-1967

## 2022-07-27 NOTE — TELEPHONE ENCOUNTER
Let India Santo know her pap test is abnormal with HGSIL-- she needs to have a repeat Coplposcopy-- I would recommend referral to GYN for this as it is more abnormal than her last pap test.  HPV is still positive as well.

## 2022-10-16 NOTE — PROGRESS NOTES
[address] [facility]  [facility] [address] [place] [address]  Dept: [phone]    COLPOSCOPY PROCEDURE FORM    Chief Complaint:   Chief Complaint   Patient presents with    Procedure     [place]          10/17/2022  Lizette Crowley  Patient's last menstrual period was 10/03/2022.  33 y.o.  Doris Rowe    Past Medical History:   Diagnosis Date    Abnormal Pap smear of cervix     Dysmenorrhea          Past Surgical History:   Procedure Laterality Date    WISDOM TOOTH EXTRACTION         Family History   Problem Relation Age of Onset    Dementia Maternal Grandmother     Heart Disease Paternal Grandmother     Heart Disease Paternal Grandfather     Breast Cancer Neg Hx     Colon Cancer Neg Hx     Uterine Cancer Neg Hx     Ovarian Cancer Neg Hx        Social History     Socioeconomic History    Marital status: Single     Spouse name: Not on file    Number of children: Not on file    Years of education: Not on file    Highest education level: Not on file   Occupational History    Not on file   Tobacco Use    Smoking status: Never    Smokeless tobacco: Never   Vaping Use    Vaping Use: Never used   Substance and Sexual Activity    Alcohol use: Yes     Comment: occasionally    Drug use: Never    Sexual activity: Yes     Partners: Male   Other Topics Concern    Not on file   Social History Narrative    Not on file     Social Determinants of Health     Financial Resource Strain: Low Risk     Difficulty of Paying Living Expenses: Not hard at all   Food Insecurity: No Food Insecurity    Worried About Running Out of Food in the Last Year: Never true    Ran Out of Food in the Last Year: Never true   Transportation Needs: No Transportation Needs    Lack of Transportation (Medical): No    Lack of Transportation (Non-Medical):  No   Physical Activity: Not on file   Stress: Not on file   Social Connections: Not on file   Intimate Partner Violence: Not on file Housing Stability: Not on file       Current Outpatient Medications on File Prior to Visit   Medication Sig Dispense Refill    Norgestim-Eth Estrad Triphasic (TRI-LO-JOE) 0.18/0.215/0.25 MG-25 MCG TABS TAKE 1 TABLET BY MOUTH EVERY DAY 84 tablet 3     No current facility-administered medications on file prior to visit. Allergies as of 10/17/2022    (No Known Allergies)           INDICATIONS:   1. Cervical polyp    2. HSIL (high grade squamous intraepithelial lesion) on Pap smear of cervix    3. Cervical high risk HPV (human papillomavirus) test positive                   UHCG: negative         HPV:   positive      Birth Control Method: OCPs  Abnormal Cytology and Colposcopy History: h/o ASC-H and colp in 2020    COLPOSCOPIC EXAMINATION:                Blood pressure 114/68, pulse 70, height 5' 2\" (1.575 m), weight 136 lb (61.7 kg), last menstrual period 10/03/2022, not currently breastfeeding. Gross observations: negative  Satisfactory: Yes   Unsatisfactory: No    Physical Exam  Exam conducted with a chaperone present. Genitourinary:                   ECC performed:  Yes    Lugols Iodine Applied:   No       IMPRESSIONS: JEAN 1-2  Biopsy sites: 6 and 12 o'clock      Assessment:   Diagnosis Orders   1. Cervical polyp  Surgical Pathology      2. HSIL (high grade squamous intraepithelial lesion) on Pap smear of cervix  Colposcopy    Surgical Pathology      3. Cervical high risk HPV (human papillomavirus) test positive  Colposcopy    Surgical Pathology            PLAN:   1. The patient was given formal restriction guidelines. She is instructed to report any increase in vaginal bleeding, discharge, or any temperature more than 100.4   F. Strict pelvic rest precautions were reviewed with lifting restrictions. Will call pt with results of colposcopy.          Peter Taylor MD

## 2022-10-17 ENCOUNTER — HOSPITAL ENCOUNTER (OUTPATIENT)
Age: 32
Setting detail: SPECIMEN
Discharge: HOME | End: 2022-10-17

## 2022-10-17 ENCOUNTER — PROCEDURE VISIT (OUTPATIENT)
Dept: OBGYN CLINIC | Age: 32
End: 2022-10-17

## 2022-10-17 VITALS
SYSTOLIC BLOOD PRESSURE: 114 MMHG | BODY MASS INDEX: 25.03 KG/M2 | HEIGHT: 62 IN | HEART RATE: 70 BPM | WEIGHT: 136 LBS | DIASTOLIC BLOOD PRESSURE: 68 MMHG

## 2022-10-17 DIAGNOSIS — N84.1 CERVICAL POLYP: Primary | ICD-10-CM

## 2022-10-17 DIAGNOSIS — R87.810 CERVICAL HIGH RISK HPV (HUMAN PAPILLOMAVIRUS) TEST POSITIVE: ICD-10-CM

## 2022-10-17 DIAGNOSIS — R87.613 HSIL (HIGH GRADE SQUAMOUS INTRAEPITHELIAL LESION) ON PAP SMEAR OF CERVIX: ICD-10-CM

## 2022-10-19 PROBLEM — D06.9 CIN III (CERVICAL INTRAEPITHELIAL NEOPLASIA GRADE III) WITH SEVERE DYSPLASIA: Status: ACTIVE | Noted: 2022-10-19

## 2022-10-19 LAB — SURGICAL PATHOLOGY REPORT: NORMAL

## 2022-11-08 ENCOUNTER — TELEPHONE (OUTPATIENT)
Dept: OBGYN CLINIC | Age: 32
End: 2022-11-08

## 2023-02-16 ENCOUNTER — ANESTHESIA EVENT (OUTPATIENT)
Dept: OPERATING ROOM | Age: 33
End: 2023-02-16
Payer: COMMERCIAL

## 2023-02-16 ENCOUNTER — HOSPITAL ENCOUNTER (OUTPATIENT)
Age: 33
Setting detail: OUTPATIENT SURGERY
Discharge: HOME | End: 2023-02-16
Attending: OBSTETRICS & GYNECOLOGY | Admitting: OBSTETRICS & GYNECOLOGY
Payer: COMMERCIAL

## 2023-02-16 ENCOUNTER — ANESTHESIA (OUTPATIENT)
Dept: OPERATING ROOM | Age: 33
End: 2023-02-16
Payer: COMMERCIAL

## 2023-02-16 VITALS
HEIGHT: 63 IN | WEIGHT: 145.5 LBS | RESPIRATION RATE: 11 BRPM | SYSTOLIC BLOOD PRESSURE: 109 MMHG | OXYGEN SATURATION: 99 % | BODY MASS INDEX: 25.78 KG/M2 | TEMPERATURE: 97.5 F | HEART RATE: 60 BPM | DIASTOLIC BLOOD PRESSURE: 69 MMHG

## 2023-02-16 DIAGNOSIS — N87.1 CIN II (CERVICAL INTRAEPITHELIAL NEOPLASIA II): ICD-10-CM

## 2023-02-16 DIAGNOSIS — G89.18 POSTOPERATIVE PAIN: Primary | ICD-10-CM

## 2023-02-16 DIAGNOSIS — D06.9 CIN III (CERVICAL INTRAEPITHELIAL NEOPLASIA GRADE III) WITH SEVERE DYSPLASIA: ICD-10-CM

## 2023-02-16 PROBLEM — Z98.890 H/O LEEP: Status: ACTIVE | Noted: 2023-02-16

## 2023-02-16 LAB
ABO/RH: NORMAL
ANTIBODY SCREEN: NEGATIVE
ARM BAND NUMBER: NORMAL
EXPIRATION DATE: NORMAL
HCG, PREGNANCY URINE (POC): NEGATIVE
HCT VFR BLD AUTO: 39.5 % (ref 36.3–47.1)
HGB BLD-MCNC: 13 G/DL (ref 11.9–15.1)
MCH RBC QN AUTO: 30.8 PG (ref 25.2–33.5)
MCHC RBC AUTO-ENTMCNC: 32.9 G/DL (ref 28.4–34.8)
MCV RBC AUTO: 93.6 FL (ref 82.6–102.9)
NRBC AUTOMATED: 0 PER 100 WBC
PDW BLD-RTO: 11.9 % (ref 11.8–14.4)
PLATELET # BLD AUTO: 218 K/UL (ref 138–453)
PMV BLD AUTO: 9.9 FL (ref 8.1–13.5)
RBC # BLD: 4.22 M/UL (ref 3.95–5.11)
WBC # BLD AUTO: 6.2 K/UL (ref 3.5–11.3)

## 2023-02-16 PROCEDURE — 2709999900 HC NON-CHARGEABLE SUPPLY: Performed by: OBSTETRICS & GYNECOLOGY

## 2023-02-16 PROCEDURE — 6370000000 HC RX 637 (ALT 250 FOR IP): Performed by: OBSTETRICS & GYNECOLOGY

## 2023-02-16 PROCEDURE — 6360000002 HC RX W HCPCS: Performed by: NURSE ANESTHETIST, CERTIFIED REGISTERED

## 2023-02-16 PROCEDURE — 3700000001 HC ADD 15 MINUTES (ANESTHESIA): Performed by: OBSTETRICS & GYNECOLOGY

## 2023-02-16 PROCEDURE — 86850 RBC ANTIBODY SCREEN: CPT

## 2023-02-16 PROCEDURE — 57522 CONIZATION OF CERVIX: CPT | Performed by: OBSTETRICS & GYNECOLOGY

## 2023-02-16 PROCEDURE — 86901 BLOOD TYPING SEROLOGIC RH(D): CPT

## 2023-02-16 PROCEDURE — 2580000003 HC RX 258: Performed by: OBSTETRICS & GYNECOLOGY

## 2023-02-16 PROCEDURE — 88307 TISSUE EXAM BY PATHOLOGIST: CPT

## 2023-02-16 PROCEDURE — 3600000014 HC SURGERY LEVEL 4 ADDTL 15MIN: Performed by: OBSTETRICS & GYNECOLOGY

## 2023-02-16 PROCEDURE — 7100000001 HC PACU RECOVERY - ADDTL 15 MIN: Performed by: OBSTETRICS & GYNECOLOGY

## 2023-02-16 PROCEDURE — 2500000003 HC RX 250 WO HCPCS: Performed by: NURSE ANESTHETIST, CERTIFIED REGISTERED

## 2023-02-16 PROCEDURE — 3700000000 HC ANESTHESIA ATTENDED CARE: Performed by: OBSTETRICS & GYNECOLOGY

## 2023-02-16 PROCEDURE — 3600000004 HC SURGERY LEVEL 4 BASE: Performed by: OBSTETRICS & GYNECOLOGY

## 2023-02-16 PROCEDURE — 7100000000 HC PACU RECOVERY - FIRST 15 MIN: Performed by: OBSTETRICS & GYNECOLOGY

## 2023-02-16 PROCEDURE — 85027 COMPLETE CBC AUTOMATED: CPT

## 2023-02-16 PROCEDURE — 86900 BLOOD TYPING SEROLOGIC ABO: CPT

## 2023-02-16 PROCEDURE — 81025 URINE PREGNANCY TEST: CPT

## 2023-02-16 PROCEDURE — 7100000010 HC PHASE II RECOVERY - FIRST 15 MIN: Performed by: OBSTETRICS & GYNECOLOGY

## 2023-02-16 RX ORDER — FENTANYL CITRATE 50 UG/ML
INJECTION, SOLUTION INTRAMUSCULAR; INTRAVENOUS PRN
Status: DISCONTINUED | OUTPATIENT
Start: 2023-02-16 | End: 2023-02-16 | Stop reason: SDUPTHER

## 2023-02-16 RX ORDER — SODIUM CHLORIDE 9 MG/ML
INJECTION, SOLUTION INTRAVENOUS PRN
Status: CANCELLED | OUTPATIENT
Start: 2023-02-16

## 2023-02-16 RX ORDER — DEXAMETHASONE SODIUM PHOSPHATE 10 MG/ML
INJECTION INTRAMUSCULAR; INTRAVENOUS PRN
Status: DISCONTINUED | OUTPATIENT
Start: 2023-02-16 | End: 2023-02-16 | Stop reason: SDUPTHER

## 2023-02-16 RX ORDER — SODIUM CHLORIDE, SODIUM LACTATE, POTASSIUM CHLORIDE, CALCIUM CHLORIDE 600; 310; 30; 20 MG/100ML; MG/100ML; MG/100ML; MG/100ML
INJECTION, SOLUTION INTRAVENOUS CONTINUOUS
Status: DISCONTINUED | OUTPATIENT
Start: 2023-02-16 | End: 2023-02-16 | Stop reason: HOSPADM

## 2023-02-16 RX ORDER — OXYCODONE HYDROCHLORIDE 5 MG/1
5 TABLET ORAL EVERY 6 HOURS PRN
Qty: 5 TABLET | Refills: 0 | Status: SHIPPED | OUTPATIENT
Start: 2023-02-16 | End: 2023-02-18

## 2023-02-16 RX ORDER — ONDANSETRON 4 MG/1
4 TABLET, ORALLY DISINTEGRATING ORAL EVERY 8 HOURS PRN
Qty: 30 TABLET | Refills: 0 | Status: SHIPPED | OUTPATIENT
Start: 2023-02-16

## 2023-02-16 RX ORDER — HYDRALAZINE HYDROCHLORIDE 20 MG/ML
10 INJECTION INTRAMUSCULAR; INTRAVENOUS
Status: CANCELLED | OUTPATIENT
Start: 2023-02-16

## 2023-02-16 RX ORDER — SODIUM CHLORIDE 0.9 % (FLUSH) 0.9 %
5-40 SYRINGE (ML) INJECTION EVERY 12 HOURS SCHEDULED
Status: CANCELLED | OUTPATIENT
Start: 2023-02-16

## 2023-02-16 RX ORDER — SODIUM CHLORIDE 0.9 % (FLUSH) 0.9 %
5-40 SYRINGE (ML) INJECTION PRN
Status: CANCELLED | OUTPATIENT
Start: 2023-02-16

## 2023-02-16 RX ORDER — ONDANSETRON 2 MG/ML
INJECTION INTRAMUSCULAR; INTRAVENOUS PRN
Status: DISCONTINUED | OUTPATIENT
Start: 2023-02-16 | End: 2023-02-16 | Stop reason: SDUPTHER

## 2023-02-16 RX ORDER — LIDOCAINE HYDROCHLORIDE 10 MG/ML
INJECTION, SOLUTION EPIDURAL; INFILTRATION; INTRACAUDAL; PERINEURAL PRN
Status: DISCONTINUED | OUTPATIENT
Start: 2023-02-16 | End: 2023-02-16 | Stop reason: SDUPTHER

## 2023-02-16 RX ORDER — ONDANSETRON 2 MG/ML
4 INJECTION INTRAMUSCULAR; INTRAVENOUS
Status: CANCELLED | OUTPATIENT
Start: 2023-02-16 | End: 2023-02-17

## 2023-02-16 RX ORDER — IODINE SOLUTION STRONG 5% (LUGOL'S) 5 %
SOLUTION ORAL PRN
Status: DISCONTINUED | OUTPATIENT
Start: 2023-02-16 | End: 2023-02-16 | Stop reason: ALTCHOICE

## 2023-02-16 RX ORDER — KETOROLAC TROMETHAMINE 30 MG/ML
INJECTION, SOLUTION INTRAMUSCULAR; INTRAVENOUS PRN
Status: DISCONTINUED | OUTPATIENT
Start: 2023-02-16 | End: 2023-02-16 | Stop reason: SDUPTHER

## 2023-02-16 RX ORDER — SCOLOPAMINE TRANSDERMAL SYSTEM 1 MG/1
1 PATCH, EXTENDED RELEASE TRANSDERMAL ONCE
Status: DISCONTINUED | OUTPATIENT
Start: 2023-02-16 | End: 2023-02-16 | Stop reason: HOSPADM

## 2023-02-16 RX ORDER — SENNA AND DOCUSATE SODIUM 50; 8.6 MG/1; MG/1
1 TABLET, FILM COATED ORAL DAILY
Qty: 30 TABLET | Refills: 1 | Status: SHIPPED | OUTPATIENT
Start: 2023-02-16

## 2023-02-16 RX ORDER — PROPOFOL 10 MG/ML
INJECTION, EMULSION INTRAVENOUS PRN
Status: DISCONTINUED | OUTPATIENT
Start: 2023-02-16 | End: 2023-02-16 | Stop reason: SDUPTHER

## 2023-02-16 RX ORDER — IBUPROFEN 800 MG/1
800 TABLET ORAL EVERY 8 HOURS PRN
Qty: 60 TABLET | Refills: 1 | Status: SHIPPED | OUTPATIENT
Start: 2023-02-16

## 2023-02-16 RX ADMIN — LIDOCAINE HYDROCHLORIDE 50 MG: 10 INJECTION, SOLUTION EPIDURAL; INFILTRATION; INTRACAUDAL; PERINEURAL at 09:27

## 2023-02-16 RX ADMIN — KETOROLAC TROMETHAMINE 30 MG: 30 INJECTION, SOLUTION INTRAMUSCULAR; INTRAVENOUS at 10:05

## 2023-02-16 RX ADMIN — SODIUM CHLORIDE, POTASSIUM CHLORIDE, SODIUM LACTATE AND CALCIUM CHLORIDE: 600; 310; 30; 20 INJECTION, SOLUTION INTRAVENOUS at 08:59

## 2023-02-16 RX ADMIN — ONDANSETRON 4 MG: 2 INJECTION INTRAMUSCULAR; INTRAVENOUS at 09:44

## 2023-02-16 RX ADMIN — FENTANYL CITRATE 50 MCG: 50 INJECTION, SOLUTION INTRAMUSCULAR; INTRAVENOUS at 09:28

## 2023-02-16 RX ADMIN — DEXAMETHASONE SODIUM PHOSPHATE 10 MG: 10 INJECTION INTRAMUSCULAR; INTRAVENOUS at 09:44

## 2023-02-16 RX ADMIN — PROPOFOL 150 MG: 10 INJECTION, EMULSION INTRAVENOUS at 09:28

## 2023-02-16 ASSESSMENT — PAIN - FUNCTIONAL ASSESSMENT: PAIN_FUNCTIONAL_ASSESSMENT: 0-10

## 2023-02-16 NOTE — ANESTHESIA POSTPROCEDURE EVALUATION
Department of Anesthesiology  Postprocedure Note    Patient: Leobardo Yates  MRN: [id number]  YOB: 1990  Date of evaluation: 2/16/2023      Procedure Summary     Date: 02/16/23 Room / Location: [address]    Anesthesia Start: 3347 Anesthesia Stop: 0670    Procedure: LEEP Diagnosis:       JEAN II (cervical intraepithelial neoplasia II)      JEAN III (cervical intraepithelial neoplasia grade III) with severe dysplasia      (JEAN II AND JEAN III)    Surgeons: Rao Daniels MD Responsible Provider: Jamee Moreno MD    Anesthesia Type: general ASA Status: 1          Anesthesia Type: No value filed.     Emilie Phase I: Emilie Score: 10    Emilie Phase II: Emilie Score: 10      Anesthesia Post Evaluation    Patient location during evaluation: bedside  Patient participation: complete - patient participated  Level of consciousness: awake  Airway patency: patent  Nausea & Vomiting: no nausea and no vomiting  Complications: no  Cardiovascular status: blood pressure returned to baseline  Respiratory status: acceptable  Hydration status: euvolemic  Comments: /69   Pulse 60   Temp 97.5 °F (36.4 °C) (Temporal)   Resp 11   Ht 5' 2.5\" (1.588 m)   Wt 145 lb 8.1 oz (66 kg)   LMP 02/01/2023 Comment: hcg neg 2/16/2023  SpO2 99%   BMI 26.19 kg/m²

## 2023-02-16 NOTE — ANESTHESIA PRE PROCEDURE
Department of Anesthesiology  Preprocedure Note       Name:  José Miguel Mantilla   Age:  33 y.o.  :  1990                                          MRN:  [id number]         Date:  2023      Surgeon: Isra Hong):  Maxi Mendez MD    Procedure: Procedure(s):  LEEP    Medications prior to admission:   Prior to Admission medications    Medication Sig Start Date End Date Taking? Authorizing Provider   Norgestim-Eth Estrad Triphasic (TRI-LO-JOE) 0.18/0.215/0.25 MG-25 MCG TABS TAKE 1 TABLET BY MOUTH EVERY DAY 22   Michelle Juárez MD       Current medications:    No current facility-administered medications for this encounter.      Current Outpatient Medications   Medication Sig Dispense Refill    Norgestim-Eth Estrad Triphasic (TRI-LO-JOE) 0.18/0.215/0.25 MG-25 MCG TABS TAKE 1 TABLET BY MOUTH EVERY DAY 84 tablet 3       Allergies:  No Known Allergies    Problem List:    Patient Active Problem List   Diagnosis Code    Dysmenorrhea N94.6    Abnormal Pap smear of cervix R87.619    HSIL (high grade squamous intraepithelial lesion) on Pap smear of cervix R87.613    Cervical high risk HPV (human papillomavirus) test positive R87.810    JEAN III (cervical intraepithelial neoplasia grade III) with severe dysplasia D06.9       Past Medical History:        Diagnosis Date    Abnormal Pap smear of cervix     COVID-19 vaccination not done     Dysmenorrhea     Wellness examination     PCP Dr Olga Covarrubias       Past Surgical History:        Procedure Laterality Date    WISDOM TOOTH EXTRACTION         Social History:    Social History     Tobacco Use    Smoking status: Never    Smokeless tobacco: Never   Substance Use Topics    Alcohol use: Yes     Comment: occasionally                                Counseling given: Not Answered      Vital Signs (Current):   Vitals:    02/15/23 1135   Weight: 145 lb (65.8 kg)   Height: 5' 2.5\" (1.588 m)                                              BP Readings from Last 3 Encounters:   10/17/22 114/68   07/18/22 (!) 106/56   04/25/22 104/60       NPO Status:                                                                                 BMI:   Wt Readings from Last 3 Encounters:   10/17/22 136 lb (61.7 kg)   07/18/22 134 lb (60.8 kg)   04/25/22 137 lb (62.1 kg)     Body mass index is 26.1 kg/m². CBC:   Lab Results   Component Value Date/Time    HGB 12.6 06/29/2020 12:24 PM       CMP: No results found for: NA, K, CL, CO2, BUN, CREATININE, GFRAA, AGRATIO, LABGLOM, GLUCOSE, GLU, PROT, CALCIUM, BILITOT, ALKPHOS, AST, ALT    POC Tests: No results for input(s): POCGLU, POCNA, POCK, POCCL, POCBUN, POCHEMO, POCHCT in the last 72 hours. Coags: No results found for: PROTIME, INR, APTT    HCG (If Applicable):   Lab Results   Component Value Date    PREGTESTUR negative 09/01/2020        ABGs: No results found for: PHART, PO2ART, RRM3KJG, RWV4HCL, BEART, A9PFCKOJ     Type & Screen (If Applicable):  No results found for: LABABO, LABRH    Drug/Infectious Status (If Applicable):  No results found for: HIV, HEPCAB    COVID-19 Screening (If Applicable): No results found for: COVID19        Anesthesia Evaluation  Patient summary reviewed and Nursing notes reviewed no history of anesthetic complications:   Airway: Mallampati: I  TM distance: >3 FB   Neck ROM: full  Mouth opening: > = 3 FB   Dental: normal exam         Pulmonary:Negative Pulmonary ROS breath sounds clear to auscultation                             Cardiovascular:Negative CV ROS  Exercise tolerance: good (>4 METS),           Rhythm: regular  Rate: normal                    Neuro/Psych:   Negative Neuro/Psych ROS              GI/Hepatic/Renal: Neg GI/Hepatic/Renal ROS            Endo/Other: Negative Endo/Other ROS                    Abdominal:             Vascular: negative vascular ROS. Other Findings:           Anesthesia Plan      general     ASA 1     (LMA)  Induction: intravenous.     MIPS: Postoperative opioids intended and Prophylactic antiemetics administered. Anesthetic plan and risks discussed with patient. Plan discussed with CRNA.                     Nimco Heller MD   2/16/2023

## 2023-02-16 NOTE — OP NOTE
Operative Note  Department of Obstetrics and Gynecology  [facility] [place]       Patient: Makeda Claudio   : 1990  MRN: [id number]       Acct: [de-identified]   PCP: Behzad Camargo MD  Date of Procedure: 23    Pre-operative Diagnosis: 33 y.o. female   HSIL with positive other HRHPV on pap smear  CIN2-3 on colposcopic biopsy     Post-operative Diagnosis: same,     Procedure: Loop electrosurgical excisional procedure    Surgeon: Dr. Orquidea Calderón  Assistants: Jacquie Ochoa,  PGY3    Anesthesia: general    Indications:  Patient is a 730 100 628 with pap smear 22 showing HSIL with positive other HRHPV. Colposcopy performed 10/17/22 showed CIN2-3 at 12 o'clock biopsy, LEEP recommended per ASCCP guidelines. Patient agreeable to surgical management at this time. Procedure Details: The patient was seen in the pre-op room. The risks, benefits, complications, treatment options, and expected outcomes were discussed with the patient. The patient concurred with the proposed plan, giving informed consent. The patient was taken to the Operating Room and identified as Makeda Claudio and the procedure was verified. A Time Out was held and the above information confirmed. After administration of general anesthesia, the patient was placed in the dorsolithotomy position utilizing Yellofin stirrups. The patient was draped in the usual sterile fashion. The insulated speculum was placed into the vagina to visualize the cervix. Lugol's Iodine was applied to the cervix revealing abnormal uptake at the 11-12 o'clock region. Using a 15mm x12mm loop biopsies of the anterior and posterior cervix were obtained. Ball electrocautery was then used for hemostasis at the site of the excision, excellent hemostasis noted. All instruments were then removed from the vagina. Instrument, sponge, and needle counts were correct at the conclusion of the case.   SCDs for DVT prophylaxis remain in place for the post operative period. Dr. Becca Curtis was present for the entire operation.     Findings:  Normal appearing external genitalia, normal appearing vaginal mucosa, abnormal uptake of Lugol's iodine at 11-12 o'clock region otherwise normal appearing cervix without lesions or lacerations  Estimated Blood Loss: <5ml  Drains:  None  Total IV Fluids: 300 ml  Urine output: Patient voided prior to procedure  Specimens:  Anterior and posterior cervix  Instrument and Sponge Count: Correct  Complications: none  Condition: stable, transfer to post anesthesia recovery    Sylvie Marshall DO  Ob/Gyn Resident  2/16/2023, 10:09 AM

## 2023-02-16 NOTE — H&P
OB/GYN Pre-Op H&P  [facility]    Patient Name: Cat Ulrich     Patient : 1990  [facility]  Admission Date/Time: 2023  7:10 AM  Primary Care Physician: Brenda Singh MD  MRN: [id number]    Date: 2023  Time: 8:47 AM    The patient was seen in pre-op holding. She is here for loop electrosurgical excisional procedure. Patient is a 31YO with pap smear 22 showing HSIL with positive other HRHPV. Colposcopy performed 10/17/22 showed CIN2-3 at 12 o'clock biopsy, LEEP recommended per ASCCP guidelines. Patient agreeable to surgical management at this time. The procedure risks and complications were reviewed. The labs, Consent, and H&P were reviewed and updated. The patient was counseled on the possibility of  the need of a second surgery. The patient voiced understanding and had all of her questions answered. The possibility of incomplete removal of abnormal tissue was discussed. OBSTETRICAL HISTORY:   OB History    Para Term  AB Living   0 0 0 0 0 0   SAB IAB Ectopic Molar Multiple Live Births   0 0 0 0 0 0       PAST MEDICAL HISTORY:   has a past medical history of Abnormal Pap smear of cervix, COVID-19 vaccination not done, Dysmenorrhea, and Wellness examination. PAST SURGICAL HISTORY:   has a past surgical history that includes [place] tooth extraction.     ALLERGIES:  Allergies as of 12/15/2022    (No Known Allergies)       MEDICATIONS:  Current Facility-Administered Medications   Medication Dose Route Frequency Provider Last Rate Last Admin    scopolamine (TRANSDERM-SCOP) transdermal patch 1 patch  1 patch TransDERmal Once Deb Muller MD        lactated ringers IV soln infusion   IntraVENous Continuous Deb Muller MD           FAMILY HISTORY:  family history includes Dementia in her maternal grandmother; Heart Disease in her mother, paternal grandfather, and paternal grandmother; No Known Problems in her father. SOCIAL HISTORY:   reports that she has never smoked. She has never used smokeless tobacco. She reports current alcohol use. She reports that she does not use drugs. VITALS:  Vitals:    02/15/23 1135 02/16/23 0802 02/16/23 0813 02/16/23 0832   BP:    (!) 113/52   Resp:    16   Temp:    97.5 °F (36.4 °C)   TempSrc:    Temporal   SpO2:    100%   Weight: 145 lb (65.8 kg)  145 lb 8.1 oz (66 kg)    Height: 5' 2.5\" (1.588 m) 5' 2.5\" (1.588 m)                                                                                                                                 PHYSICAL EXAM:     Unchanged from Prior H&P  CONSTITUTIONAL:  Alert and oriented, no acute distress  HEAD: normocephalic, atraumatic  EYES: Pupils equal and reactive to light, Extraocular muscles intact, sclera non icteric  ENT: Mucus membranes moist, No otorrhea, no rhinorrhea  NECK:  supple, symmetrical, trachea midline   LUNGS:  Good air movement bilaterally, unlabored respirations, no wheezes or rhonchi  CARDIOVASCULAR: Regular rate and rhythm, no murmurs rubs or gallops  ABDOMEN: soft, non tender, non distended, no rebound or guarding, no hernias, no hepatomegaly, no splenomegly  MUSCULOSKELETAL:  Equal strength bilaterally, normal muscle tone  SKIN: No abscess or rash  NEUROLOGIC:  Cranial nerves 2-12 grossly intact, no focal deficits  PSYCH: affect appropriate  Pelvic Exam: deferred to OR      LAB RESULTS:  No visits with results within 4 Week(s) from this visit. Latest known visit with results is:   Hospital Outpatient Visit on 10/17/2022   Component Date Value Ref Range Status    Surgical Pathology Report 10/17/2022    Final                    Value:-- Diagnosis --  A. Endocervix, curettage:  Chronic endocervicitis, negative for dysplasia. B.  Uterine cervix, 6:00, colposcopic biopsy:  Chronic endocervicitis. Rare superficial squamous epithelial cells, negative for dysplasia.     C.  Uterine cervix, 12:00, colposcopic biopsy:  JEAN-2,3.    D. Uterine cervix, colposcopic polypectomy:  Endocervical polyp with mixed inflammation and vascular congestion,  negative for dysplasia. ANTOINETTE Price.  **Electronically Signed Out**         sf/10/19/2022       Clinical Information  Pre-op Diagnosis:  HSIL, CERVICAL HIGH RISK HPV TEST POSITIVE   Operative Findings:  ECC; 6:00 BX; 12:00 BX; CERVICAL POLYP     Source of Specimen  A: ECC  B: 6 O'CLOCK BX  C: 12 O'CLOCK BX  D: CERVICAL POLYP    Gross Description  A. \"KG STEPHENS, ECC\" Mucinous tan fragments, 2.5 x 2.0 x 0.2 cm in  aggregate. Entirely 1cs. B. \"KG STEPHENS, 6\" Two tan fragments, 0.3 x 0.2 x 0.1 cm and 0.6 x  0.4 x 0.2 cm. Entirely 1cs. Omega Ends                          , 12\" Numerous tan fragments from minute to 0.2 cm  and are 0.4 x 0.3 x 0.1 cm in aggregate. Entirely 1cs. D. \"KG STEPHENS, CERVICAL POLYP\" 1.3 x 1.0 x 0.4 cm irregular  brown-tan fragment. Entirely 1cs. tm      Microscopic Description  A-D.  2 H&E reviewed for each. Microscopic examination performed. SURGICAL PATHOLOGY CONSULTATION       Patient Name: Phillip Bryan: [id number]  Path Number: [id number]    [address] [place] [address]. [facility], [address]  [phone]  Fax: [phone]             DIAGNOSIS & PLAN:  1. CIN2-3 on colposcopic biopsy   - Proceed with planned procedure: Loop electrosurgical excisional procedure   - Consent signed, on chart. - The patient is ready for transport to the operative suite. Counseling: The patient was counseled on all options both medical and surgical, conservative as well as definitive. She has elected to proceed with the procedure as stated above. The patient was counseled on the procedure. Risks and complications were reviewed in detail. The patients orders, labs, consents have been completed.  The history and physical as well as all supporting surgical documentation will be forwarded to the pre-operative holding area. The patient is aware that this procedure may not alleviate her symptoms. That there may be a necessity for a second surgery and that there may be an incomplete removal of abnormal tissue.     Radha Sylvester DO  Ob/Gyn Resident    [facility], [place]  2/16/2023, 8:47 AM

## 2023-02-17 LAB — SURGICAL PATHOLOGY REPORT: NORMAL

## 2023-03-11 NOTE — PROGRESS NOTES
[facility] [address] [facility] [address]  Dept: [phone]    Ruy Rosario  3/13/2023  8:35 AM      Ruy Rosario  Procedure: LEEP      Ruy Rosario is a 33 y.o. female       The patient was seen, she denies any complaints. She denied any shortness of breath, chest pain or dizziness. She denied any nausea, vomiting, or diarrhea. There is no fever, chills, or rigors. The patient denies any vaginal bleeding, discharge or odor. All of her pre-operative complaints are now resolved. Blood pressure 106/65, not currently breastfeeding. Abdominal Exam: soft non-tender. Good bowel sounds. No guarding, rebound or rigidity. No costal vertebral angle tenderness bilateral. No hernias    Extremities: No edema or calf pain noted bilaterally. Pelvic Exam:  Exam deferred. Results for orders placed or performed during the hospital encounter of 23   CBC   Result Value Ref Range    WBC 6.2 3.5 - 11.3 k/uL    RBC 4.22 3.95 - 5.11 m/uL    Hemoglobin 13.0 11.9 - 15.1 g/dL    Hematocrit 39.5 36.3 - 47.1 %    MCV 93.6 82.6 - 102.9 fL    MCH 30.8 25.2 - 33.5 pg    MCHC 32.9 28.4 - 34.8 g/dL    RDW 11.9 11.8 - 14.4 %    Platelets 198 742 - 617 k/uL    MPV 9.9 8.1 - 13.5 fL    NRBC Automated 0.0 0.0 per 100 WBC   SURGICAL PATHOLOGY REPORT   Result Value Ref Range    Surgical Pathology Report       -- Diagnosis --    A. ANTERIOR CERVIX, LEEP:  -HIGH-GRADE SQUAMOUS INTRAEPITHELIAL LESION (HSIL; JEAN 2), OBSERVED  INKED MARGINS CLEAR    B.  POSTERIOR CERVIX, LEEP:  -HIGH-GRADE SQUAMOUS INTRAEPITHELIAL LESION (HSIL; JEAN 2-3), WITH  EXTENSION INTO ADJACENT ENDOCERVICAL GLANDS  -DYSPLASIA NOTED AT CIRCUMFERENTIAL ENDOCERVICAL CAUTERIZED MARGIN      Fermin Delaney D.O.  **Electronically Signed Out**         [facility]       Clinical Information  Pre-op Diagnosis:  JEAN II AND JEAN III   Operative Findings:  ANTERIOR CERVIX; POSTERIOR CERVIX  Operation Performed:  LEEP  tm     Source of Specimen  A: ANTERIOR CERVIX  B: POSTERIOR CERVIX    Gross Description  A. \"KG STEPHENS, ANTERIOR CERVIX\" unoriented portion of cervix that  is 2.0 x 1.6 x 0.7 cm. Scant tan-pink hyperemic to slightly  eroded-appearing mucosa is identified. An obvious lesion is not  identified. The presumed endocervical margin is inked blue and the  presumed stromal margin is inked black. Sectioning of the tiss ue  fragment reveals tan-pink, granular cut surfaces. Also received within  the specimen container are two additional tissue fragments, 0.6 cm and  0.8 cm. Totally embedded in 3cs. \"1-2\" largest tissue fragment  \"3\" two additional tissue fragments within the specimen. B. \"KG STEPHENS POSTERIOR CERVIX\" Multiple tan-pink granular  tissue fragments from 0.4 to 1.9 cm. The resection margin is  indeterminate. Sectioning of the tissue fragments revealed tan,  rubbery, granular to slightly mucinous-appearing cut surfaces. Also  received within the specimen container is a 1.6 cm in length by 1.2 cm  in diameter, clear synthetic foreign object, which is slightly  cauterized appearing at each end. The soft tissue is totally embedded  in 2c.   mj        Microscopic Description  A, B. Microscopic examination performed. SURGICAL PATHOLOGY CONSULTATION       Patient Name: Mynor Mcca[facility] Rec: [id number]  Path Number: [id number]    [address]   CONSULTING PATHOLOGISTS [facility]  [facility]  [address] [place] [address]. [place], [address]  [phone]  Fax: [phone]     POCT urine pregnancy   Result Value Ref Range    HCG, Pregnancy Urine (POC) NEGATIVE NEGATIVE   TYPE AND SCREEN   Result Value Ref Range    Expiration Date 02/19/2023,2359     Arm Band Number BE [id number]     ABO/Rh A POSITIVE     Antibody Screen NEGATIVE            Assessment:      Diagnosis Orders   1. S/p LEEP 2/16/23        2.  JEAN III (cervical intraepithelial neoplasia grade III) with severe dysplasia             Patient Active Problem List    Diagnosis Date Noted    JEAN II (cervical intraepithelial neoplasia II) 02/16/2023     Priority: Medium    S/p LEEP 2/16/23 02/16/2023     Priority: Medium    JEAN III (cervical intraepithelial neoplasia grade III) with severe dysplasia 10/19/2022     Priority: Medium     2022      HSIL (high grade squamous intraepithelial lesion) on Pap smear of cervix 10/17/2022     Priority: Medium     2022      Cervical high risk HPV (human papillomavirus) test positive 10/17/2022     Priority: Medium    Dysmenorrhea     Abnormal Pap smear of cervix           POD# 25   Procedure: see above   Stable   Pathology reviewed and found to be benign. Yes  + endocervical margin    Plan:   Return in about 6 months (around 9/13/2023) for repeat Pap.      Bjorn Anaya MD

## 2023-03-13 ENCOUNTER — OFFICE VISIT (OUTPATIENT)
Dept: OBGYN CLINIC | Age: 33
End: 2023-03-13

## 2023-03-13 VITALS
WEIGHT: 144 LBS | BODY MASS INDEX: 24.59 KG/M2 | DIASTOLIC BLOOD PRESSURE: 65 MMHG | SYSTOLIC BLOOD PRESSURE: 106 MMHG | HEIGHT: 64 IN

## 2023-03-13 DIAGNOSIS — D06.9 CIN III (CERVICAL INTRAEPITHELIAL NEOPLASIA GRADE III) WITH SEVERE DYSPLASIA: ICD-10-CM

## 2023-03-13 DIAGNOSIS — Z98.890 H/O LEEP: Primary | ICD-10-CM

## 2023-03-13 PROCEDURE — 99024 POSTOP FOLLOW-UP VISIT: CPT | Performed by: OBSTETRICS & GYNECOLOGY

## 2023-04-28 DIAGNOSIS — N94.6 DYSMENORRHEA: ICD-10-CM

## 2023-05-01 RX ORDER — NORGESTIMATE AND ETHINYL ESTRADIOL 7DAYSX3 LO
KIT ORAL
Qty: 84 TABLET | Refills: 0 | Status: SHIPPED | OUTPATIENT
Start: 2023-05-01

## 2023-05-01 NOTE — TELEPHONE ENCOUNTER
Trudi Andersen is requesting a refill on the following medication(s):  Requested Prescriptions     Pending Prescriptions Disp Refills    Norgestim-Eth Estrad Triphasic (TRI-LO-ESTARYLLA) 0.18/0.215/0.25 MG-25 MCG TABS [Pharmacy Med Name: Tri-Lo-Estarylla Oral Tablet 0.18/0.215/0.25 MG-25 MCG] 84 tablet 0     Sig: TAKE 1 TABLET BY MOUTH EVERY DAY       Last Visit Date (If Applicable):  2/40/0118    Next Visit Date:    7/24/2023

## 2023-06-07 DIAGNOSIS — N94.6 DYSMENORRHEA: ICD-10-CM

## 2023-06-07 NOTE — TELEPHONE ENCOUNTER
Ruy Abraham is requesting a refill on the following medication(s):  Requested Prescriptions     Pending Prescriptions Disp Refills    Norgestim-Eth Estrad Triphasic (TRI-LO-ESTARYLLA) 0.18/0.215/0.25 MG-25 MCG TABS 84 tablet 0     Sig: TAKE 1 TABLET BY MOUTH EVERY DAY       Last Visit Date (If Applicable):  7/61/2033    Next Visit Date:    7/24/2023

## 2023-06-08 DIAGNOSIS — N94.6 DYSMENORRHEA: ICD-10-CM

## 2023-06-08 RX ORDER — NORGESTIMATE AND ETHINYL ESTRADIOL 7DAYSX3 LO
KIT ORAL
Qty: 84 TABLET | Refills: 0 | OUTPATIENT
Start: 2023-06-08

## 2023-06-08 RX ORDER — NORGESTIMATE AND ETHINYL ESTRADIOL 7DAYSX3 LO
KIT ORAL
Qty: 84 TABLET | Refills: 0 | Status: SHIPPED | OUTPATIENT
Start: 2023-06-08

## 2023-06-08 NOTE — TELEPHONE ENCOUNTER
[facility] is requesting a refill on the following medication(s):  Requested Prescriptions     Pending Prescriptions Disp Refills    Norgestim-Eth Estrad Triphasic (TRI-LO-ESTARYLLA) 0.18/0.215/0.25 MG-25 MCG TABS 84 tablet 0     Sig: TAKE 1 TABLET BY MOUTH EVERY DAY       Last Visit Date (If Applicable):  3/91/3643    Next Visit Date:    7/24/2023
[Postmenopausal] : postmenopausal

## 2023-08-03 ENCOUNTER — OFFICE VISIT (OUTPATIENT)
Dept: FAMILY MEDICINE CLINIC | Age: 33
End: 2023-08-03
Payer: COMMERCIAL

## 2023-08-03 VITALS
HEART RATE: 83 BPM | DIASTOLIC BLOOD PRESSURE: 60 MMHG | OXYGEN SATURATION: 99 % | BODY MASS INDEX: 24.37 KG/M2 | SYSTOLIC BLOOD PRESSURE: 122 MMHG | WEIGHT: 142 LBS

## 2023-08-03 DIAGNOSIS — F41.9 ANXIETY: Primary | ICD-10-CM

## 2023-08-03 DIAGNOSIS — Z87.828 HISTORY OF STRANGULATION ASSAULT: ICD-10-CM

## 2023-08-03 DIAGNOSIS — F41.8 SITUATIONAL ANXIETY: ICD-10-CM

## 2023-08-03 DIAGNOSIS — S10.93XA CONTUSION OF NECK, INITIAL ENCOUNTER: ICD-10-CM

## 2023-08-03 DIAGNOSIS — S00.452A FOREIGN BODY OF EXTERNAL EAR, LEFT, INITIAL ENCOUNTER: ICD-10-CM

## 2023-08-03 PROCEDURE — 10120 INC&RMVL FB SUBQ TISS SMPL: CPT | Performed by: FAMILY MEDICINE

## 2023-08-03 PROCEDURE — 99214 OFFICE O/P EST MOD 30 MIN: CPT | Performed by: FAMILY MEDICINE

## 2023-08-03 RX ORDER — LIDOCAINE HYDROCHLORIDE 10 MG/ML
0.3 INJECTION, SOLUTION INFILTRATION; PERINEURAL ONCE
Status: DISCONTINUED | OUTPATIENT
Start: 2023-08-03 | End: 2023-08-03

## 2023-08-03 RX ORDER — LIDOCAINE HYDROCHLORIDE 20 MG/ML
0.3 INJECTION, SOLUTION INFILTRATION; PERINEURAL ONCE
Status: COMPLETED | OUTPATIENT
Start: 2023-08-03 | End: 2023-08-03

## 2023-08-03 RX ORDER — ESCITALOPRAM OXALATE 10 MG/1
10 TABLET ORAL DAILY
Qty: 30 TABLET | Refills: 5 | Status: SHIPPED | OUTPATIENT
Start: 2023-08-03

## 2023-08-03 RX ADMIN — LIDOCAINE HYDROCHLORIDE 0.3 ML: 20 INJECTION, SOLUTION INFILTRATION; PERINEURAL at 17:00

## 2023-08-03 SDOH — ECONOMIC STABILITY: INCOME INSECURITY: HOW HARD IS IT FOR YOU TO PAY FOR THE VERY BASICS LIKE FOOD, HOUSING, MEDICAL CARE, AND HEATING?: NOT HARD AT ALL

## 2023-08-03 SDOH — ECONOMIC STABILITY: HOUSING INSECURITY
IN THE LAST 12 MONTHS, WAS THERE A TIME WHEN YOU DID NOT HAVE A STEADY PLACE TO SLEEP OR SLEPT IN A SHELTER (INCLUDING NOW)?: NO

## 2023-08-03 SDOH — ECONOMIC STABILITY: FOOD INSECURITY: WITHIN THE PAST 12 MONTHS, YOU WORRIED THAT YOUR FOOD WOULD RUN OUT BEFORE YOU GOT MONEY TO BUY MORE.: NEVER TRUE

## 2023-08-03 SDOH — ECONOMIC STABILITY: FOOD INSECURITY: WITHIN THE PAST 12 MONTHS, THE FOOD YOU BOUGHT JUST DIDN'T LAST AND YOU DIDN'T HAVE MONEY TO GET MORE.: NEVER TRUE

## 2023-08-03 ASSESSMENT — PATIENT HEALTH QUESTIONNAIRE - PHQ9
5. POOR APPETITE OR OVEREATING: SEVERAL DAYS
8. MOVING OR SPEAKING SO SLOWLY THAT OTHER PEOPLE COULD HAVE NOTICED. OR THE OPPOSITE - BEING SO FIDGETY OR RESTLESS THAT YOU HAVE BEEN MOVING AROUND A LOT MORE THAN USUAL: NOT AT ALL
6. FEELING BAD ABOUT YOURSELF - OR THAT YOU ARE A FAILURE OR HAVE LET YOURSELF OR YOUR FAMILY DOWN: 3
SUM OF ALL RESPONSES TO PHQ QUESTIONS 1-9: 12
2. FEELING DOWN, DEPRESSED OR HOPELESS: NEARLY EVERY DAY
SUM OF ALL RESPONSES TO PHQ9 QUESTIONS 1 & 2: 5
1. LITTLE INTEREST OR PLEASURE IN DOING THINGS: MORE THAN HALF THE DAYS
4. FEELING TIRED OR HAVING LITTLE ENERGY: 1
7. TROUBLE CONCENTRATING ON THINGS, SUCH AS READING THE NEWSPAPER OR WATCHING TELEVISION: SEVERAL DAYS
10. IF YOU CHECKED OFF ANY PROBLEMS, HOW DIFFICULT HAVE THESE PROBLEMS MADE IT FOR YOU TO DO YOUR WORK, TAKE CARE OF THINGS AT HOME, OR GET ALONG WITH OTHER PEOPLE: SOMEWHAT DIFFICULT
9. THOUGHTS THAT YOU WOULD BE BETTER OFF DEAD, OR OF HURTING YOURSELF: NOT AT ALL
10. IF YOU CHECKED OFF ANY PROBLEMS, HOW DIFFICULT HAVE THESE PROBLEMS MADE IT FOR YOU TO DO YOUR WORK, TAKE CARE OF THINGS AT HOME, OR GET ALONG WITH OTHER PEOPLE: 1
SUM OF ALL RESPONSES TO PHQ QUESTIONS 1-9: 12
5. POOR APPETITE OR OVEREATING: 1
2. FEELING DOWN, DEPRESSED OR HOPELESS: 3
8. MOVING OR SPEAKING SO SLOWLY THAT OTHER PEOPLE COULD HAVE NOTICED. OR THE OPPOSITE, BEING SO FIGETY OR RESTLESS THAT YOU HAVE BEEN MOVING AROUND A LOT MORE THAN USUAL: 0
7. TROUBLE CONCENTRATING ON THINGS, SUCH AS READING THE NEWSPAPER OR WATCHING TELEVISION: 1
4. FEELING TIRED OR HAVING LITTLE ENERGY: SEVERAL DAYS
9. THOUGHTS THAT YOU WOULD BE BETTER OFF DEAD, OR OF HURTING YOURSELF: 0
SUM OF ALL RESPONSES TO PHQ QUESTIONS 1-9: 12
3. TROUBLE FALLING OR STAYING ASLEEP: 1
3. TROUBLE FALLING OR STAYING ASLEEP: SEVERAL DAYS
1. LITTLE INTEREST OR PLEASURE IN DOING THINGS: 2
6. FEELING BAD ABOUT YOURSELF - OR THAT YOU ARE A FAILURE OR HAVE LET YOURSELF OR YOUR FAMILY DOWN: NEARLY EVERY DAY
SUM OF ALL RESPONSES TO PHQ QUESTIONS 1-9: 12
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
## 2023-09-14 ENCOUNTER — OFFICE VISIT (OUTPATIENT)
Dept: FAMILY MEDICINE CLINIC | Age: 33
End: 2023-09-14
Payer: COMMERCIAL

## 2023-09-14 VITALS
OXYGEN SATURATION: 99 % | SYSTOLIC BLOOD PRESSURE: 120 MMHG | WEIGHT: 141 LBS | DIASTOLIC BLOOD PRESSURE: 60 MMHG | BODY MASS INDEX: 24.2 KG/M2 | HEART RATE: 77 BPM

## 2023-09-14 DIAGNOSIS — Z20.2 EXPOSURE TO SEXUALLY TRANSMITTED DISEASE (STD): ICD-10-CM

## 2023-09-14 DIAGNOSIS — F41.9 ANXIETY: ICD-10-CM

## 2023-09-14 DIAGNOSIS — N94.6 DYSMENORRHEA: ICD-10-CM

## 2023-09-14 DIAGNOSIS — Z01.419 PAP TEST, AS PART OF ROUTINE GYNECOLOGICAL EXAMINATION: Primary | ICD-10-CM

## 2023-09-14 DIAGNOSIS — F41.8 SITUATIONAL ANXIETY: ICD-10-CM

## 2023-09-14 PROCEDURE — 99395 PREV VISIT EST AGE 18-39: CPT | Performed by: FAMILY MEDICINE

## 2023-09-14 RX ORDER — NORGESTIMATE AND ETHINYL ESTRADIOL 7DAYSX3 LO
KIT ORAL
Qty: 84 TABLET | Refills: 0 | Status: SHIPPED | OUTPATIENT
Start: 2023-09-14

## 2023-09-14 RX ORDER — ESCITALOPRAM OXALATE 10 MG/1
10 TABLET ORAL DAILY
Qty: 30 TABLET | Refills: 5 | Status: SHIPPED | OUTPATIENT
Start: 2023-09-14

## 2023-09-14 NOTE — PROGRESS NOTES
Leep completed in February-- today is the follow from that procedure. No concerns no pelvic pain,     No pregnancies in the past.   Not sexually active.
slightly  cauterized appearing at each end. The soft tissue is totally embedded  in 2c.   mj        Microscopic Description  A, B. Microscopic examination performed. SURGICAL PATHOLOGY CONSULTATION       Patient Name: Mychal Wood Med Rec: [id number]  Path Number: [id number]    [address] [facility]  CONSULTING PATHOLOGISTS ANNAMARIA                          [facility]  [facility]  [address] [facility], [address] [place] [address]  [phone]  Fax: [phone]         Assessment and Plan:    Well female exam with routine gynecological exam  Pap smear was obtainedtoday using the Thin Prep method. She will be contacted with results. Mammogram is not indicated today   She was advised to continue recommended schedule for age mammograms andphysical exams, with a Pap test every 3-5 years. 1. Pap test, as part of routine gynecological examination    - PAP SMEAR; Future    2. Dysmenorrhea    - Norgestim-Eth Estrad Triphasic (TRI-LO-ESTARYLLA) 0.18/0.215/0.25 MG-25 MCG TABS; TAKE 1 TABLET BY MOUTH EVERY DAY  Dispense: 84 tablet; Refill: 0    3. Anxiety    - escitalopram (LEXAPRO) 10 MG tablet; Take 1 tablet by mouth daily  Dispense: 30 tablet; Refill: 5    4. Situational anxiety    - escitalopram (LEXAPRO) 10 MG tablet; Take 1 tablet by mouth daily  Dispense: 30 tablet; Refill: 5    5. Exposure to sexually transmitted disease (STD)    - Chlamydia/GC DNA, Urine; Future      Routine health maintenance  Immunizations reviewed and none indicated at this time. Annual flu vaccine recommended. Importance of healthydiet with adequate dietary calcium and vitamin D reviewed. Seat belt use, sunscreen use and condom use if sexually active also reviewed today. Importance of regular physical activity also reviewed.

## 2023-09-18 LAB
CHLAMYDIA TRACHOMATIS DNA, URINE: NEGATIVE
N. GONORRHOEAE DNA, URINE: NEGATIVE

## 2023-09-20 DIAGNOSIS — Z01.419 PAP TEST, AS PART OF ROUTINE GYNECOLOGICAL EXAMINATION: ICD-10-CM

## 2023-09-23 LAB — GYNECOLOGY CYTOLOGY REPORT: NORMAL

## 2024-03-07 DIAGNOSIS — N94.6 DYSMENORRHEA: ICD-10-CM

## 2024-03-07 NOTE — TELEPHONE ENCOUNTER
Cookie Eagle is requesting a refill on the following medication(s):  Requested Prescriptions     Pending Prescriptions Disp Refills    Norgestim-Eth Estrad Triphasic (TRI-LO-ESTARYLLA) 0.18/0.215/0.25 MG-25 MCG TABS 84 tablet 3     Sig: TAKE 1 TABLET BY MOUTH EVERY DAY       Last Visit Date (If Applicable):  9/14/2023    Next Visit Date:    3/11/2024

## 2024-03-08 RX ORDER — NORGESTIMATE AND ETHINYL ESTRADIOL 7DAYSX3 LO
KIT ORAL
Qty: 84 TABLET | Refills: 3 | Status: SHIPPED | OUTPATIENT
Start: 2024-03-08

## 2024-03-11 ENCOUNTER — OFFICE VISIT (OUTPATIENT)
Dept: FAMILY MEDICINE CLINIC | Age: 34
End: 2024-03-11
Payer: COMMERCIAL

## 2024-03-11 VITALS
HEART RATE: 86 BPM | OXYGEN SATURATION: 99 % | WEIGHT: 151 LBS | SYSTOLIC BLOOD PRESSURE: 114 MMHG | DIASTOLIC BLOOD PRESSURE: 56 MMHG | BODY MASS INDEX: 25.92 KG/M2

## 2024-03-11 DIAGNOSIS — F41.9 ANXIETY: ICD-10-CM

## 2024-03-11 DIAGNOSIS — N94.6 DYSMENORRHEA: Primary | ICD-10-CM

## 2024-03-11 PROCEDURE — 99214 OFFICE O/P EST MOD 30 MIN: CPT | Performed by: FAMILY MEDICINE

## 2024-03-11 ASSESSMENT — PATIENT HEALTH QUESTIONNAIRE - PHQ9
SUM OF ALL RESPONSES TO PHQ QUESTIONS 1-9: 0
1. LITTLE INTEREST OR PLEASURE IN DOING THINGS: 0
SUM OF ALL RESPONSES TO PHQ QUESTIONS 1-9: 0
SUM OF ALL RESPONSES TO PHQ9 QUESTIONS 1 & 2: 0
2. FEELING DOWN, DEPRESSED OR HOPELESS: 0

## 2024-03-11 NOTE — PROGRESS NOTES
ear normal.   Eyes:      Extraocular Movements: Extraocular movements intact.      Conjunctiva/sclera: Conjunctivae normal.   Neck:      Thyroid: No thyromegaly.      Vascular: No JVD.   Cardiovascular:      Rate and Rhythm: Normal rate and regular rhythm.      Pulses: Normal pulses.      Heart sounds: Normal heart sounds. No murmur heard.     No friction rub. No gallop.   Pulmonary:      Effort: Pulmonary effort is normal. No respiratory distress.      Breath sounds: Normal breath sounds.   Musculoskeletal:      Cervical back: Normal range of motion and neck supple.      Right lower leg: No edema.      Left lower leg: No edema.   Lymphadenopathy:      Cervical: No cervical adenopathy.   Skin:     General: Skin is warm.      Capillary Refill: Capillary refill takes less than 2 seconds.   Neurological:      General: No focal deficit present.      Mental Status: She is alert and oriented to person, place, and time.   Psychiatric:         Mood and Affect: Mood normal.         Behavior: Behavior normal.         Thought Content: Thought content normal.         Judgment: Judgment normal.               Multiple labs and other testing may have been ordered which may not be completely evident from the above note due to system interface incompatibilities.     Patient given educational materials - see patientinstructions.  Discussed use, benefit, and side effects of prescribed medications.  All patient questions answered.  Pt voiced understanding. Reviewed health maintenance.  Instructed to continue current medications, diet andexercise.  Patient agreed with treatment plan. Follow up as directed.     (Please note that portions of this note were completed with a voice-recognition program. Efforts were made to edit the dictation but occasionally words are mis-transcribed.)    Electronically signed by Cookie Bradford MD on 3/17/2024

## 2025-01-23 ENCOUNTER — OFFICE VISIT (OUTPATIENT)
Dept: FAMILY MEDICINE CLINIC | Age: 35
End: 2025-01-23

## 2025-01-23 VITALS
WEIGHT: 152 LBS | OXYGEN SATURATION: 99 % | DIASTOLIC BLOOD PRESSURE: 68 MMHG | HEART RATE: 69 BPM | BODY MASS INDEX: 25.95 KG/M2 | SYSTOLIC BLOOD PRESSURE: 118 MMHG | HEIGHT: 64 IN

## 2025-01-23 DIAGNOSIS — R53.82 CHRONIC FATIGUE: ICD-10-CM

## 2025-01-23 DIAGNOSIS — N94.6 DYSMENORRHEA: Primary | ICD-10-CM

## 2025-01-23 DIAGNOSIS — L20.84 INTRINSIC ECZEMA: ICD-10-CM

## 2025-01-23 LAB
BASOPHILS ABSOLUTE: 0.05 X10E3/?L (ref 0–0.3)
BASOPHILS RELATIVE PERCENT: 0.62 % (ref 0–3)
EOSINOPHILS ABSOLUTE: 0.09 X10E3/?L (ref 0–1.1)
EOSINOPHILS RELATIVE PERCENT: 1.12 % (ref 0–10)
HCT VFR BLD CALC: 39.1 % (ref 37–47)
HEMOGLOBIN: 12.7 G/DL (ref 12–16)
LYMPHOCYTES ABSOLUTE: 1.37 X10E3/?L (ref 1–5.5)
LYMPHOCYTES RELATIVE PERCENT: 17.4 % (ref 20–51.1)
MCH RBC QN AUTO: 30.3 PG (ref 28.5–32.5)
MCHC RBC AUTO-ENTMCNC: 32.6 G/DL (ref 32–37)
MCV RBC AUTO: 93 FL (ref 80–94)
MONOCYTES ABSOLUTE: 0.49 X10E3/?L (ref 0.1–1)
MONOCYTES RELATIVE PERCENT: 6.23 % (ref 1.7–9.3)
NEUTROPHILS ABSOLUTE: 5.86 X10E3/?L (ref 2–8.1)
NEUTROPHILS RELATIVE PERCENT: 74.63 % (ref 42.2–75.2)
PDW BLD-RTO: 10.8 % (ref 8.5–15.5)
PLATELET # BLD: 274.2 THOU/MM3 (ref 130–400)
RBC # BLD: 4.2 M/UL (ref 4.2–5.4)
TSH REFLEX FT4: 1.21 MIU/ML (ref 0.49–4.67)
WBC # BLD: 7.9 THOU/ML3 (ref 4.8–10.8)

## 2025-01-23 RX ORDER — FLUOCINOLONE ACETONIDE 0.25 MG/G
OINTMENT TOPICAL
Qty: 60 G | Refills: 1 | Status: SHIPPED | OUTPATIENT
Start: 2025-01-23

## 2025-01-23 SDOH — ECONOMIC STABILITY: FOOD INSECURITY: WITHIN THE PAST 12 MONTHS, YOU WORRIED THAT YOUR FOOD WOULD RUN OUT BEFORE YOU GOT MONEY TO BUY MORE.: NEVER TRUE

## 2025-01-23 SDOH — ECONOMIC STABILITY: FOOD INSECURITY: WITHIN THE PAST 12 MONTHS, THE FOOD YOU BOUGHT JUST DIDN'T LAST AND YOU DIDN'T HAVE MONEY TO GET MORE.: NEVER TRUE

## 2025-01-23 ASSESSMENT — PATIENT HEALTH QUESTIONNAIRE - PHQ9
SUM OF ALL RESPONSES TO PHQ QUESTIONS 1-9: 0
1. LITTLE INTEREST OR PLEASURE IN DOING THINGS: NOT AT ALL
SUM OF ALL RESPONSES TO PHQ9 QUESTIONS 1 & 2: 0
SUM OF ALL RESPONSES TO PHQ QUESTIONS 1-9: 0
2. FEELING DOWN, DEPRESSED OR HOPELESS: NOT AT ALL

## 2025-01-23 NOTE — PROGRESS NOTES
[facility]  [address], [address]  [place], [place] [address]  Dept: [phone]  Dept Fax:[phone]    Cookie Eagle is a 34 y.o. female who presents today for her medical conditions/complaints as notedbelow.        Assessment/Plan:     Assessment & Plan  1. Eczema.  The rash on her arm appears to be eczema, characterized by a patch rather than a ring. She has been advised to revert to using unscented body wash for a period to observe any changes. A prescription for a high-potency steroid cream has been provided, with instructions to apply it twice daily. This cream can be used on other areas of eczema flare-ups, excluding the face due to its strength.    2. Menstrual cycle management.  She has been informed that she may experience spotting throughout the next month due to the interruption of her natural hormone cycle. However, the contraceptive should remain effective as long as doses are not missed. Emotional fluctuations may also occur. If the current plan is successful, it can be repeated in April 2025 to further delay her period by an additional week. She has been advised to skip the placebo week and commence a new pill pack immediately.    3. Fatigue.  Her last blood work was conducted approximately 2 years ago. She has been advised to establish a consistent bedtime routine, including setting an alarm on her phone half an hour before bedtime, turning off electronics, and engaging in calming activities such as reading a book. A blood count will be ordered today to rule out anemia. Thyroid function will also be assessed.      Assessment & Plan  Dysmenorrhea     Intrinsic eczema     Orders:    fluocinolone (SYNALAR) 0.025 % ointment; Apply topically 2 times daily.    Chronic fatigue     Orders:    CBC with Auto Differential; Future    TSH reflex to FT4; Future         Results      Lab Results   Component Value Date    WBC 6.2 02/16/2023    HGB 13.0 02/16/2023    HCT 39.5

## 2025-03-06 ENCOUNTER — OFFICE VISIT (OUTPATIENT)
Dept: FAMILY MEDICINE CLINIC | Age: 35
End: 2025-03-06
Payer: COMMERCIAL

## 2025-03-06 VITALS
SYSTOLIC BLOOD PRESSURE: 126 MMHG | DIASTOLIC BLOOD PRESSURE: 64 MMHG | BODY MASS INDEX: 25.61 KG/M2 | OXYGEN SATURATION: 99 % | HEART RATE: 68 BPM | HEIGHT: 64 IN | WEIGHT: 150 LBS

## 2025-03-06 DIAGNOSIS — Z12.4 SCREENING FOR CERVICAL CANCER: ICD-10-CM

## 2025-03-06 DIAGNOSIS — Z00.00 WELL ADULT EXAM: Primary | ICD-10-CM

## 2025-03-06 DIAGNOSIS — N94.6 DYSMENORRHEA: ICD-10-CM

## 2025-03-06 PROCEDURE — 99395 PREV VISIT EST AGE 18-39: CPT | Performed by: FAMILY MEDICINE

## 2025-03-06 RX ORDER — IBUPROFEN 800 MG/1
800 TABLET, FILM COATED ORAL EVERY 8 HOURS PRN
Qty: 60 TABLET | Refills: 1 | Status: SHIPPED | OUTPATIENT
Start: 2025-03-06

## 2025-03-06 NOTE — PROGRESS NOTES
MG tablet; Take 1 tablet by mouth every 8 hours as needed for Pain  Dispense: 60 tablet; Refill: 1      Routine health maintenance  Immunizations reviewed and none indicated at this time.  Annual flu vaccine recommended.  Importance of healthydiet with adequate dietary calcium and vitamin D reviewed. Seat belt use, sunscreen use and condom use if sexually active also reviewed today.  Importance of regular physical activity also reviewed.

## 2025-03-14 LAB — GYNECOLOGY CYTOLOGY REPORT: NORMAL

## 2025-03-16 ENCOUNTER — RESULTS FOLLOW-UP (OUTPATIENT)
Dept: FAMILY MEDICINE CLINIC | Age: 35
End: 2025-03-16

## 2025-04-20 DIAGNOSIS — N94.6 DYSMENORRHEA: ICD-10-CM

## 2025-04-21 RX ORDER — NORGESTIMATE AND ETHINYL ESTRADIOL 7DAYSX3 LO
KIT ORAL
Qty: 84 TABLET | Refills: 3 | Status: SHIPPED | OUTPATIENT
Start: 2025-04-21

## 2025-04-21 NOTE — TELEPHONE ENCOUNTER
Cookie Eagle is requesting a refill on the following medication(s):  Requested Prescriptions     Pending Prescriptions Disp Refills    Norgestim-Eth Estrad Triphasic 0.18/0.215/0.25 MG-25 MCG TABS 84 tablet 3     Sig: TAKE 1 TABLET BY MOUTH EVERY DAY       Last Visit Date (If Applicable):  3/6/2025    Next Visit Date:    3/12/2026

## 2025-07-12 DIAGNOSIS — N94.6 DYSMENORRHEA: ICD-10-CM

## 2025-07-14 DIAGNOSIS — N94.6 DYSMENORRHEA: ICD-10-CM

## 2025-07-14 RX ORDER — NORGESTIMATE AND ETHINYL ESTRADIOL 7DAYSX3 LO
KIT ORAL
Qty: 84 TABLET | Refills: 3 | Status: SHIPPED | OUTPATIENT
Start: 2025-07-14

## 2025-07-14 RX ORDER — NORGESTIMATE AND ETHINYL ESTRADIOL 7DAYSX3 LO
KIT ORAL
Qty: 84 TABLET | Refills: 3 | OUTPATIENT
Start: 2025-07-14

## 2025-07-14 NOTE — TELEPHONE ENCOUNTER
Cookie Eagle is requesting a refill on the following medication(s):  Requested Prescriptions     Pending Prescriptions Disp Refills    Norgestim-Eth Estrad Triphasic 0.18/0.215/0.25 MG-25 MCG TABS 84 tablet 3     Sig: TAKE 1 TABLET BY MOUTH EVERY DAY       Last Visit Date (If Applicable):  3/6/2025      Next Visit Date:    3/12/2026

## (undated) DEVICE — NEPTUNE E-SEP SMOKE EVACUATION PENCIL, COATED, 70MM BLADE, PUSH BUTTON SWITCH: Brand: NEPTUNE E-SEP

## (undated) DEVICE — GLOVE ORANGE PI 8 1/2   MSG9085

## (undated) DEVICE — YANKAUER,FLEXIBLE HANDLE,REGLR CAPACITY: Brand: MEDLINE INDUSTRIES, INC.

## (undated) DEVICE — GARMENT,MEDLINE,DVT,INT,CALF,MED, GEN2: Brand: MEDLINE

## (undated) DEVICE — ELECTRODE ARTHSCP 15X11MM SHFT 11CM MPLR LOOP GRN DISP W/

## (undated) DEVICE — ELECTRODE ES L11CM DIA5MM BALL SHFT RED DISP UTAHLOOP

## (undated) DEVICE — CONTAINER,SPECIMEN,4OZ,OR STRL: Brand: MEDLINE

## (undated) DEVICE — SMOKE EVACUATION TUBING WITH 7/8 IN TO 1/4 IN REDUCER: Brand: BUFFALO FILTER

## (undated) DEVICE — MARKER,SKIN,WI/RULER AND LABELS: Brand: MEDLINE

## (undated) DEVICE — SYRINGE, LUER LOCK, 10ML: Brand: MEDLINE

## (undated) DEVICE — DRESSING TRNSPAR W2XL2.75IN FLM SHT SEMIPERMEABLE WIND

## (undated) DEVICE — GLOVE SURG SZ 65 THK91MIL LTX FREE SYN POLYISOPRENE

## (undated) DEVICE — 1016 S-DRAPE IRRIG POUCH 10/BOX: Brand: STERI-DRAPE™

## (undated) DEVICE — GLOVE SURG SZ 6 THK91MIL LTX FREE SYN POLYISOPRENE ANTI

## (undated) DEVICE — SVMMC GYN MIN PK

## (undated) DEVICE — COVER OR TBL W40XL90IN ABSRB STD AND GRIPPY BK SAHARA

## (undated) DEVICE — APPLICATOR FIBER TIP 16 IN CELLULOSE HD SWAB CHEVRON SCPET